# Patient Record
Sex: FEMALE | Race: WHITE | NOT HISPANIC OR LATINO | Employment: UNEMPLOYED | URBAN - NONMETROPOLITAN AREA
[De-identification: names, ages, dates, MRNs, and addresses within clinical notes are randomized per-mention and may not be internally consistent; named-entity substitution may affect disease eponyms.]

---

## 2022-06-27 ENCOUNTER — OFFICE VISIT (OUTPATIENT)
Dept: FAMILY MEDICINE CLINIC | Age: 37
End: 2022-06-27

## 2022-06-27 ENCOUNTER — HOSPITAL ENCOUNTER (OUTPATIENT)
Dept: GENERAL RADIOLOGY | Facility: HOSPITAL | Age: 37
Discharge: HOME | End: 2022-06-27
Admitting: NURSE PRACTITIONER

## 2022-06-27 VITALS
HEIGHT: 68 IN | SYSTOLIC BLOOD PRESSURE: 114 MMHG | WEIGHT: 164.2 LBS | HEART RATE: 65 BPM | DIASTOLIC BLOOD PRESSURE: 73 MMHG | BODY MASS INDEX: 24.89 KG/M2

## 2022-06-27 DIAGNOSIS — Z23 NEED FOR TDAP VACCINATION: ICD-10-CM

## 2022-06-27 DIAGNOSIS — H61.22 IMPACTED CERUMEN OF LEFT EAR: ICD-10-CM

## 2022-06-27 DIAGNOSIS — F41.1 GENERALIZED ANXIETY DISORDER: Primary | ICD-10-CM

## 2022-06-27 DIAGNOSIS — Z00.00 ANNUAL PHYSICAL EXAM: ICD-10-CM

## 2022-06-27 DIAGNOSIS — M41.9 SCOLIOSIS OF THORACIC SPINE, UNSPECIFIED SCOLIOSIS TYPE: ICD-10-CM

## 2022-06-27 DIAGNOSIS — M54.50 CHRONIC MIDLINE LOW BACK PAIN WITHOUT SCIATICA: ICD-10-CM

## 2022-06-27 DIAGNOSIS — G89.29 CHRONIC MIDLINE LOW BACK PAIN WITHOUT SCIATICA: ICD-10-CM

## 2022-06-27 DIAGNOSIS — F32.1 CURRENT MODERATE EPISODE OF MAJOR DEPRESSIVE DISORDER WITHOUT PRIOR EPISODE: ICD-10-CM

## 2022-06-27 PROCEDURE — 90715 TDAP VACCINE 7 YRS/> IM: CPT | Performed by: NURSE PRACTITIONER

## 2022-06-27 PROCEDURE — 72072 X-RAY EXAM THORAC SPINE 3VWS: CPT

## 2022-06-27 PROCEDURE — 90471 IMMUNIZATION ADMIN: CPT | Performed by: NURSE PRACTITIONER

## 2022-06-27 PROCEDURE — 72100 X-RAY EXAM L-S SPINE 2/3 VWS: CPT

## 2022-06-27 PROCEDURE — 99204 OFFICE O/P NEW MOD 45 MIN: CPT | Performed by: NURSE PRACTITIONER

## 2022-06-27 RX ORDER — MELOXICAM 7.5 MG/1
7.5 TABLET ORAL DAILY
Qty: 90 TABLET | Refills: 0 | Status: SHIPPED | OUTPATIENT
Start: 2022-06-27 | End: 2022-09-22

## 2022-06-27 RX ORDER — CITALOPRAM 10 MG/1
10 TABLET ORAL DAILY
Qty: 40 TABLET | Refills: 0 | Status: SHIPPED | OUTPATIENT
Start: 2022-06-27 | End: 2022-07-28

## 2022-06-27 RX ORDER — HYDROXYZINE HYDROCHLORIDE 25 MG/1
25 TABLET, FILM COATED ORAL 3 TIMES DAILY PRN
COMMUNITY
End: 2022-07-28 | Stop reason: SDUPTHER

## 2022-06-27 NOTE — ASSESSMENT & PLAN NOTE
-xray of thoracic and lumbar spine today in office  -Mobic given for pain  -refer to pain management

## 2022-06-27 NOTE — ASSESSMENT & PLAN NOTE
-pt would like to have ear flushed at follow up/later date  -impaction is not severe at this time

## 2022-06-27 NOTE — ASSESSMENT & PLAN NOTE
-start celexa 10 mg to see if this helps  -may cont hydroxyzine but hopefully we can get this dose lowered as celexa starts to work   -denies suicide, homicide and self harm thoughts  -educated on SE of celexa and to report to office if these occurs    normal...

## 2022-06-27 NOTE — PROGRESS NOTES
Leticia Durán presents to [facility] with complaint of  Establish Care and Anxiety (Discuss med)    SUBJECTIVE  History of Present Illness     The patient is here to establish relations. She recently moved here from [place] but she is originally from [place]. She is currently going through a divorce after being  for 15 years. She will be working at [employer] and starts there on Monday.     She had screening blood work 6 months ago that was normal per pt.     She does have a hx of anxiety and depression, but never required meds until this past December. She started having anxiety and panic attacks that were triggered when he grandmother passed away this past December. She feels like her anxiety and depression are worsened as she is going through divorce. She has been on hydroxyzine 75 mg daily and the hydroxyzine is not making a difference. She says she has episodes of crying. She takes the hydroxyzine at nighttime.     She is having back pain, this is not new for her but she says this pain is getting worse. She does say she has a history of scoliosis but was not severe enough to require brace or surgery per pt. She does see a chiropractor but has not been giving pain relief recently.  She has lower back pain that is descibed as a constant ache, pain does not radiate. Denies numbness or tingling, or limb weakness. She takes Tylenol as needed and it does help sometimes. She has done PT for back pain for 6 months back in 2017 which made her pain worse she says.     She has had hyst so she does not need cervical cancer screens.     The following portions of the patient's history were reviewed and updated as appropriate: allergies, current medications, past family history, past medical history, past social history, past surgical history and problem list.    OBJECTIVE  Vital Signs:   /73 (BP Location: Left arm, Patient Position: Sitting)   Pulse 65   Ht 172.7 cm  "(68\")   Wt 74.5 kg (164 lb 3.2 oz)   BMI 24.97 kg/m²       Physical Exam  Vitals reviewed.   Constitutional:       General: She is not in acute distress.     Appearance: Normal appearance. She is not ill-appearing.   HENT:      Head: Normocephalic and atraumatic.      Right Ear: Tympanic membrane and ear canal normal.      Left Ear: Tympanic membrane normal. There is impacted cerumen.      Nose: Nose normal.      Mouth/Throat:      Mouth: Mucous membranes are moist.      Pharynx: Oropharynx is clear.   Neck:      Thyroid: No thyromegaly or thyroid tenderness.   Cardiovascular:      Rate and Rhythm: Normal rate and regular rhythm.      Pulses: Normal pulses.      Heart sounds: Normal heart sounds.   Pulmonary:      Effort: Pulmonary effort is normal.      Breath sounds: Normal breath sounds.   Musculoskeletal:      Cervical back: Neck supple.   Lymphadenopathy:      Cervical: No cervical adenopathy.   Skin:     General: Skin is warm and dry.      Capillary Refill: Capillary refill takes less than 2 seconds.   Neurological:      General: No focal deficit present.      Mental Status: She is alert and oriented to person, place, and time. Mental status is at baseline.   Psychiatric:         Mood and Affect: Mood normal.         Behavior: Behavior normal.         Judgment: Judgment normal.              ASSESSMENT AND PLAN:  Diagnoses and all orders for this visit:    1. Generalized anxiety disorder (Primary)  Assessment & Plan:  -start celexa 10 mg to see if this helps  -may cont hydroxyzine but hopefully we can get this dose lowered as celexa starts to work   -denies suicide, homicide and self harm thoughts  -educated on SE of celexa and to report to office if these occurs     Orders:  -     citalopram (CeleXA) 10 MG tablet; Take 1 tablet by mouth Daily.  Dispense: 40 tablet; Refill: 0    2. Annual physical exam    3. Current moderate episode of major depressive disorder without prior episode ([facility])  Assessment & " Plan:  -phq9 is 12 today  -start celexa 10 mg qday      Orders:  -     citalopram (CeleXA) 10 MG tablet; Take 1 tablet by mouth Daily.  Dispense: 40 tablet; Refill: 0    4. Need for Tdap vaccination  -     Tdap Vaccine Greater Than or Equal To 6yo IM    5. Chronic midline low back pain without sciatica  Assessment & Plan:  -xray of thoracic and lumbar spine today in office  -Mobic given for pain  -refer to pain management     Orders:  -     Ambulatory Referral to Pain Management  -     meloxicam (Mobic) 7.5 MG tablet; Take 1 tablet by mouth Daily.  Dispense: 90 tablet; Refill: 0  -     XR Spine Lumbar 2 or 3 View (In Office)  -     XR Spine Thoracic 3 View (In Office)    6. Impacted cerumen of left ear  Assessment & Plan:  -pt would like to have ear flushed at follow up/later date  -impaction is not severe at this time       7. Scoliosis of thoracic spine, unspecified scoliosis type  -     Ambulatory Referral to Pain Management      19 to 39: Counseling/Anticipatory Guidance Discussed: nutrition, physical activity, healthy weight, injury prevention, dental health, mental health, immunizations and breast cancer and self breast exams    Follow Up   Return in about 1 month (around 7/27/2022). Patient to notify office with any acute concerns or issues.  Patient verbalizes understanding, agrees with plan of care and has no further questions upon discharge.     Patient was given instructions and counseling regarding her condition or for health maintenance advice. Please see specific information pulled into the AVS if appropriate.

## 2022-06-30 ENCOUNTER — PATIENT ROUNDING (BHMG ONLY) (OUTPATIENT)
Dept: FAMILY MEDICINE CLINIC | Age: 37
End: 2022-06-30

## 2022-06-30 NOTE — PROGRESS NOTES
June 30, 2022    Hello, may I speak with Leticia Durán?    My name is Giselle Shirley, RN    I am  with [facility]  [address] [facility] [address]  [facility] [address] [phone].    Before we get started may I verify your date of birth? 1985    I am calling to officially welcome you to our practice and ask about your recent visit. Is this a good time to talk? yes    Tell me about your visit with us. What things went well?  She was very nice and listened.  Went smooth.       We're always looking for ways to make our patients' experiences even better. Do you have recommendations on ways we may improve?  no    Overall were you satisfied with your first visit to our practice? yes       I appreciate you taking the time to speak with me today. Is there anything else I can do for you? no      Thank you, and have a great day.

## 2022-07-28 ENCOUNTER — OFFICE VISIT (OUTPATIENT)
Dept: FAMILY MEDICINE CLINIC | Age: 37
End: 2022-07-28

## 2022-07-28 VITALS
HEART RATE: 65 BPM | HEIGHT: 68 IN | DIASTOLIC BLOOD PRESSURE: 66 MMHG | BODY MASS INDEX: 24.95 KG/M2 | WEIGHT: 164.6 LBS | SYSTOLIC BLOOD PRESSURE: 116 MMHG

## 2022-07-28 DIAGNOSIS — F32.1 CURRENT MODERATE EPISODE OF MAJOR DEPRESSIVE DISORDER WITHOUT PRIOR EPISODE: ICD-10-CM

## 2022-07-28 DIAGNOSIS — F41.1 GENERALIZED ANXIETY DISORDER: Primary | ICD-10-CM

## 2022-07-28 PROCEDURE — 99213 OFFICE O/P EST LOW 20 MIN: CPT | Performed by: NURSE PRACTITIONER

## 2022-07-28 RX ORDER — CITALOPRAM 20 MG/1
20 TABLET ORAL DAILY
Qty: 40 TABLET | Refills: 0 | Status: SHIPPED | OUTPATIENT
Start: 2022-07-28 | End: 2022-08-23

## 2022-07-28 RX ORDER — HYDROXYZINE HYDROCHLORIDE 25 MG/1
25 TABLET, FILM COATED ORAL 3 TIMES DAILY PRN
Qty: 90 TABLET | Refills: 2 | Status: SHIPPED | OUTPATIENT
Start: 2022-07-28

## 2022-07-28 NOTE — PROGRESS NOTES
"Leticia Durán presents to [facility] with complaint of  Anxiety (1 month follow up/)    SUBJECTIV  History of Present Illness     She is here today for follow up of anxiety and depression. At her last visit, she was started on citalopram 10 mg daily for these reasons. She had also been using atarax 25 mg tid. Today, she reports still having a few random crying episodes, but overall she can tell somewhat of a difference in her moods. She does feel like the citalopram could be stronger. She is using 50 mg of atarax to help her with sleep and this does help. She also mentions that her family can see an improvement in her mood. The patient denies thoughts of suicide, self-harm, or homicide.       OBJECTIVE  Vital Signs:   /66 (BP Location: Right arm, Patient Position: Sitting)   Pulse 65   Ht 172.7 cm (68\")   Wt 74.7 kg (164 lb 9.6 oz)   BMI 25.03 kg/m²       Physical Exam  Vitals reviewed.   Constitutional:       General: She is not in acute distress.     Appearance: Normal appearance. She is not ill-appearing.   HENT:      Head: Normocephalic and atraumatic.      Nose: Nose normal.      Mouth/Throat:      Mouth: Mucous membranes are moist.      Pharynx: Oropharynx is clear.   Cardiovascular:      Rate and Rhythm: Normal rate and regular rhythm.      Pulses: Normal pulses.      Heart sounds: Normal heart sounds.   Pulmonary:      Effort: Pulmonary effort is normal.      Breath sounds: Normal breath sounds.   Musculoskeletal:      Cervical back: Neck supple.   Skin:     General: Skin is warm and dry.      Capillary Refill: Capillary refill takes less than 2 seconds.   Neurological:      General: No focal deficit present.      Mental Status: She is alert and oriented to person, place, and time. Mental status is at baseline.   Psychiatric:         Mood and Affect: Mood normal.         Behavior: Behavior normal.         Judgment: Judgment normal.          ASSESSMENT AND " PLAN:  Diagnoses and all orders for this visit:    1. Generalized anxiety disorder (Primary)  -     citalopram (CeleXA) 20 MG tablet; Take 1 tablet by mouth Daily.  Dispense: 40 tablet; Refill: 0  -     hydrOXYzine (ATARAX) 25 MG tablet; Take 1 tablet by mouth 3 (Three) Times a Day As Needed for Anxiety. Refill needed  Dispense: 90 tablet; Refill: 2    2. Current moderate episode of major depressive disorder without prior episode (HCC)  -     citalopram (CeleXA) 20 MG tablet; Take 1 tablet by mouth Daily.  Dispense: 40 tablet; Refill: 0  -     hydrOXYzine (ATARAX) 25 MG tablet; Take 1 tablet by mouth 3 (Three) Times a Day As Needed for Anxiety. Refill needed  Dispense: 90 tablet; Refill: 2      -patient seems to be doing better mentally overall  -we will increase her citalopram to 20 mg daily and hopefully, this will further improve her anxiety and depression symptoms   -atarax refilled  -follow up in one month to re-evaluate increase in citalopram    Follow Up   Return in about 1 month (around 8/28/2022). Patient to notify office with any acute concerns or issues.  Patient verbalizes understanding, agrees with plan of care and has no further questions upon discharge.     Patient was given instructions and counseling regarding her condition or for health maintenance advice. Please see specific information pulled into the AVS if appropriate.     Discussed the importance of following up with any needed screening tests/labs/specialist appointments and any requested follow-up recommended by me today. Importance of maintaining follow-up discussed and patient accepts that missed appointments can delay diagnosis and potentially lead to worsening of conditions.

## 2022-08-12 DIAGNOSIS — F32.1 CURRENT MODERATE EPISODE OF MAJOR DEPRESSIVE DISORDER WITHOUT PRIOR EPISODE: ICD-10-CM

## 2022-08-12 DIAGNOSIS — F41.1 GENERALIZED ANXIETY DISORDER: ICD-10-CM

## 2022-08-12 RX ORDER — CITALOPRAM 10 MG/1
10 TABLET ORAL DAILY
Qty: 40 TABLET | Refills: 0 | OUTPATIENT
Start: 2022-08-12

## 2022-08-23 ENCOUNTER — HOSPITAL ENCOUNTER (EMERGENCY)
Dept: HOSPITAL 49 - FER | Age: 37
Discharge: HOME | End: 2022-08-23
Payer: COMMERCIAL

## 2022-08-23 ENCOUNTER — OFFICE VISIT (OUTPATIENT)
Dept: FAMILY MEDICINE CLINIC | Age: 37
End: 2022-08-23

## 2022-08-23 VITALS
SYSTOLIC BLOOD PRESSURE: 121 MMHG | WEIGHT: 165.2 LBS | HEART RATE: 76 BPM | OXYGEN SATURATION: 100 % | TEMPERATURE: 98.1 F | DIASTOLIC BLOOD PRESSURE: 70 MMHG | HEIGHT: 68 IN | BODY MASS INDEX: 25.04 KG/M2

## 2022-08-23 DIAGNOSIS — F41.1 GENERALIZED ANXIETY DISORDER: ICD-10-CM

## 2022-08-23 DIAGNOSIS — F41.0 PANIC ATTACK: Primary | ICD-10-CM

## 2022-08-23 DIAGNOSIS — F41.9: Primary | ICD-10-CM

## 2022-08-23 DIAGNOSIS — F32.1 CURRENT MODERATE EPISODE OF MAJOR DEPRESSIVE DISORDER WITHOUT PRIOR EPISODE: ICD-10-CM

## 2022-08-23 PROCEDURE — 99213 OFFICE O/P EST LOW 20 MIN: CPT | Performed by: NURSE PRACTITIONER

## 2022-08-23 RX ORDER — CITALOPRAM 40 MG/1
40 TABLET ORAL DAILY
Qty: 90 TABLET | Refills: 1 | Status: SHIPPED | OUTPATIENT
Start: 2022-08-23 | End: 2022-11-01

## 2022-08-23 NOTE — PROGRESS NOTES
"Leticia Durán presents to [facility] with complaint of  Anxiety (Pt states she had an anxiety attack at work this am. Pt states she had a hard time breathing, she was light headed, hyperventilating, and fingers were tingling. )    SUBJECTIVE  History of Present Illness     Patient is here today after having anxiety attack at work this morning. She says that attack came on quickly. She says her heart started racing, she was hyperventilating, and her fingers became numb. She has been on Celexa since 6/27, was at 10 mg and she was increased to 20 mg 7/28. She says up until today, she had not had any panic attacks since she has been on the celexa. She did take 25 mg of atarax during the panic attack today and she says is did seem to help after a two hour period. She denies having any recent stressors or events that would have triggered her panic attack today. She says her boss helped remove her from work and sat with her and did deep breathing exercises which also helped.       OBJECTIVE  Vital Signs:   /70 (BP Location: Left arm, Patient Position: Sitting, Cuff Size: Adult)   Pulse 76   Temp 98.1 °F (36.7 °C) (Oral)   Ht 172.7 cm (67.99\")   Wt 74.9 kg (165 lb 3.2 oz)   SpO2 100% Comment: room air  BMI 25.12 kg/m²       Physical Exam  Vitals reviewed.   Constitutional:       General: She is not in acute distress.     Appearance: Normal appearance. She is not ill-appearing.   HENT:      Head: Normocephalic and atraumatic.      Nose: Nose normal.      Mouth/Throat:      Mouth: Mucous membranes are moist.      Pharynx: Oropharynx is clear.   Cardiovascular:      Rate and Rhythm: Normal rate and regular rhythm.      Pulses: Normal pulses.      Heart sounds: Normal heart sounds.   Pulmonary:      Effort: Pulmonary effort is normal.      Breath sounds: Normal breath sounds.   Musculoskeletal:      Cervical back: Neck supple.   Skin:     General: Skin is warm and dry.      " Capillary Refill: Capillary refill takes less than 2 seconds.   Neurological:      General: No focal deficit present.      Mental Status: She is alert and oriented to person, place, and time. Mental status is at baseline.   Psychiatric:         Mood and Affect: Mood normal.         Behavior: Behavior normal.         Judgment: Judgment normal.          ASSESSMENT AND PLAN:  Diagnoses and all orders for this visit:    1. Panic attack (Primary)    2. Generalized anxiety disorder  -     citalopram (CeleXA) 40 MG tablet; Take 1 tablet by mouth Daily.  Dispense: 90 tablet; Refill: 1    3. Current moderate episode of major depressive disorder without prior episode (HCC)  -     citalopram (CeleXA) 40 MG tablet; Take 1 tablet by mouth Daily.  Dispense: 90 tablet; Refill: 1      -patient is doing well on celexa overall, her dose will be increased to 40 mg qday since she had panic attack today  -cont atarax prn  -highly encouraged counseling to patient but she says it is difficult for her to have time for this given her work schedule   -will follow up in one month to see how she is doing     Follow Up   Return in about 1 month (around 9/23/2022). Patient to notify office with any acute concerns or issues.  Patient verbalizes understanding, agrees with plan of care and has no further questions upon discharge.     Patient was given instructions and counseling regarding her condition or for health maintenance advice. Please see specific information pulled into the AVS if appropriate.     Discussed the importance of following up with any needed screening tests/labs/specialist appointments and any requested follow-up recommended by me today. Importance of maintaining follow-up discussed and patient accepts that missed appointments can delay diagnosis and potentially lead to worsening of conditions.

## 2022-09-03 DIAGNOSIS — F41.1 GENERALIZED ANXIETY DISORDER: ICD-10-CM

## 2022-09-03 DIAGNOSIS — F32.1 CURRENT MODERATE EPISODE OF MAJOR DEPRESSIVE DISORDER WITHOUT PRIOR EPISODE: ICD-10-CM

## 2022-09-05 ENCOUNTER — HOSPITAL ENCOUNTER (EMERGENCY)
Dept: HOSPITAL 49 - FER | Age: 37
Discharge: HOME | End: 2022-09-05
Payer: COMMERCIAL

## 2022-09-05 DIAGNOSIS — U07.1: Primary | ICD-10-CM

## 2022-09-05 DIAGNOSIS — Z88.5: ICD-10-CM

## 2022-09-05 DIAGNOSIS — Z28.310: ICD-10-CM

## 2022-09-05 LAB
CORONAVIRUS 2019 SARS-COV-2: POSITIVE
INFLUENZA A NAA: NEGATIVE

## 2022-09-05 PROCEDURE — U0002 COVID-19 LAB TEST NON-CDC: HCPCS

## 2022-09-06 RX ORDER — CITALOPRAM 20 MG/1
20 TABLET ORAL DAILY
Qty: 40 TABLET | Refills: 0 | OUTPATIENT
Start: 2022-09-06

## 2022-09-22 DIAGNOSIS — M54.50 CHRONIC MIDLINE LOW BACK PAIN WITHOUT SCIATICA: ICD-10-CM

## 2022-09-22 DIAGNOSIS — G89.29 CHRONIC MIDLINE LOW BACK PAIN WITHOUT SCIATICA: ICD-10-CM

## 2022-09-22 RX ORDER — MELOXICAM 7.5 MG/1
7.5 TABLET ORAL DAILY
Qty: 30 TABLET | Refills: 0 | Status: SHIPPED | OUTPATIENT
Start: 2022-09-22 | End: 2022-11-01

## 2022-09-29 ENCOUNTER — TELEPHONE (OUTPATIENT)
Dept: FAMILY MEDICINE CLINIC | Age: 37
End: 2022-09-29

## 2022-11-01 ENCOUNTER — OFFICE VISIT (OUTPATIENT)
Dept: FAMILY MEDICINE CLINIC | Age: 37
End: 2022-11-01

## 2022-11-01 VITALS
SYSTOLIC BLOOD PRESSURE: 105 MMHG | DIASTOLIC BLOOD PRESSURE: 72 MMHG | HEART RATE: 65 BPM | WEIGHT: 166 LBS | BODY MASS INDEX: 25.16 KG/M2 | HEIGHT: 68 IN

## 2022-11-01 DIAGNOSIS — G89.29 CHRONIC MIDLINE THORACIC BACK PAIN: ICD-10-CM

## 2022-11-01 DIAGNOSIS — F32.1 CURRENT MODERATE EPISODE OF MAJOR DEPRESSIVE DISORDER WITHOUT PRIOR EPISODE: ICD-10-CM

## 2022-11-01 DIAGNOSIS — M54.6 CHRONIC MIDLINE THORACIC BACK PAIN: ICD-10-CM

## 2022-11-01 DIAGNOSIS — G89.29 CHRONIC MIDLINE LOW BACK PAIN WITHOUT SCIATICA: Primary | ICD-10-CM

## 2022-11-01 DIAGNOSIS — M41.9 SCOLIOSIS OF THORACIC SPINE, UNSPECIFIED SCOLIOSIS TYPE: ICD-10-CM

## 2022-11-01 DIAGNOSIS — M54.50 CHRONIC MIDLINE LOW BACK PAIN WITHOUT SCIATICA: Primary | ICD-10-CM

## 2022-11-01 DIAGNOSIS — F41.1 GENERALIZED ANXIETY DISORDER: ICD-10-CM

## 2022-11-01 PROCEDURE — 99214 OFFICE O/P EST MOD 30 MIN: CPT | Performed by: NURSE PRACTITIONER

## 2022-11-01 RX ORDER — BACLOFEN 10 MG/1
10 TABLET ORAL 3 TIMES DAILY
Qty: 30 TABLET | Refills: 1 | Status: SHIPPED | OUTPATIENT
Start: 2022-11-01

## 2022-11-01 RX ORDER — CITALOPRAM 40 MG/1
40 TABLET ORAL DAILY
Qty: 90 TABLET | Refills: 1 | Status: SHIPPED | OUTPATIENT
Start: 2022-11-01

## 2022-11-01 RX ORDER — IBUPROFEN 800 MG/1
800 TABLET ORAL EVERY 6 HOURS PRN
Qty: 30 TABLET | Refills: 1 | Status: SHIPPED | OUTPATIENT
Start: 2022-11-01

## 2022-11-01 NOTE — PROGRESS NOTES
"Leticia Durán presents to [facility] with complaint of  Back Pain    SUBJECTIVE  Back Pain  This is a chronic problem. The current episode started more than 1 year ago. The problem occurs constantly. The problem has been gradually worsening since onset. The pain is present in the lumbar spine and thoracic spine. The quality of the pain is described as burning, shooting and stabbing. The pain does not radiate. The pain is severe. The pain is the same all the time. The symptoms are aggravated by bending, sitting, standing and position. Stiffness is present at night. Pertinent negatives include no abdominal pain, bladder incontinence, bowel incontinence, chest pain, dysuria, fever, headaches, leg pain, numbness, paresis, paresthesias, pelvic pain, perianal numbness, tingling, weakness or weight loss. Risk factors: scoliosis  She has tried NSAIDs (PT, saw pain management ) for the symptoms. Improvement on treatment: motrin helps, nothing else    Thoracic and lumbar xray show DDD and mild scoliosis. Patient was seen by pain mangement and says all that was done there was stretching, no other treatment options offered she says. Will request office note from pain management as this was not sent to me.     VIDHYA/depression: She had been taking Celexa for VIDHYA and depression but ran out, needing this med refilled. She has not had any panic attacks since she was seen here last she says. She rarely uses hydroxyzine.     OBJECTIVE  Vital Signs:   /72 (BP Location: Left arm, Patient Position: Sitting)   Pulse 65   Ht 172.7 cm (67.99\")   Wt 75.3 kg (166 lb)   BMI 25.25 kg/m²       Physical Exam  Vitals reviewed.   Constitutional:       General: She is not in acute distress.     Appearance: Normal appearance. She is not ill-appearing.   HENT:      Head: Normocephalic and atraumatic.      Nose: Nose normal.      Mouth/Throat:      Mouth: Mucous membranes are moist.      Pharynx: Oropharynx " is clear.   Cardiovascular:      Rate and Rhythm: Normal rate and regular rhythm.      Pulses: Normal pulses.      Heart sounds: Normal heart sounds.   Pulmonary:      Effort: Pulmonary effort is normal.      Breath sounds: Normal breath sounds.   Musculoskeletal:      Cervical back: Neck supple.      Thoracic back: Tenderness and bony tenderness present. Scoliosis present.      Lumbar back: Tenderness present. Positive right straight leg raise test and positive left straight leg raise test.   Skin:     General: Skin is warm and dry.   Neurological:      General: No focal deficit present.      Mental Status: She is alert and oriented to person, place, and time. Mental status is at baseline.   Psychiatric:         Mood and Affect: Mood normal.         Behavior: Behavior normal.         Judgment: Judgment normal.          Results Review:  The following data was reviewed by Zahira Catnrell, ABIODUN [unfilled] 15:31 EDT.  XR Spine Lumbar 2 or 3 View (In Office) (06/27/2022 12:17)  XR Spine Thoracic 3 View (In Office) (06/27/2022 12:17)      ASSESSMENT AND PLAN:  Diagnoses and all orders for this visit:    1. Chronic midline low back pain without sciatica (Primary)  Assessment & Plan:  - Mobic did not help her pain  -She has been taking ibuprofen and this has helped so she was given a prescription for this today as well as baclofen  -She was educated on drowsiness side effect of baclofen and to use caution with this medication  -She was advised to take this medication with food and only take on an as-needed basis  -She understands the risk of long-term NSAID use  -Orders were placed to obtain MRI, she has tried and failed physical therapy  -She would like to be evaluated by neurosurgery, order placed    Orders:  -     MRI Lumbar Spine Without Contrast; Future  -     Ambulatory Referral to Neurosurgery  -     ibuprofen (ADVIL,MOTRIN) 800 MG tablet; Take 1 tablet by mouth Every 6 (Six) Hours As Needed for Moderate Pain.  Dispense:  30 tablet; Refill: 1  -     baclofen (LIORESAL) 10 MG tablet; Take 1 tablet by mouth 3 (Three) Times a Day.  Dispense: 30 tablet; Refill: 1  -     MRI Thoracic Spine Without Contrast; Future    2. Generalized anxiety disorder  Assessment & Plan:  Psychological condition is improving with treatment.  Continue current treatment regimen.  Psychological condition  will be reassessed at the next regular appointment.  She was given a 6-month supply of Celexa    Orders:  -     citalopram (CeleXA) 40 MG tablet; Take 1 tablet by mouth Daily.  Dispense: 90 tablet; Refill: 1    3. Current moderate episode of major depressive disorder without prior episode ([facility])  Assessment & Plan:  - Improving, continue Celexa 40 mg once per day    Orders:  -     citalopram (CeleXA) 40 MG tablet; Take 1 tablet by mouth Daily.  Dispense: 90 tablet; Refill: 1    4. Chronic midline thoracic back pain  -     MRI Lumbar Spine Without Contrast; Future  -     Ambulatory Referral to Neurosurgery  -     ibuprofen (ADVIL,MOTRIN) 800 MG tablet; Take 1 tablet by mouth Every 6 (Six) Hours As Needed for Moderate Pain.  Dispense: 30 tablet; Refill: 1  -     baclofen (LIORESAL) 10 MG tablet; Take 1 tablet by mouth 3 (Three) Times a Day.  Dispense: 30 tablet; Refill: 1  -     MRI Thoracic Spine Without Contrast; Future    5. Scoliosis of thoracic spine, unspecified scoliosis type  Assessment & Plan:  -may be cause of her chronic pain   -Further work-up as discussed above          Follow Up   No follow-ups on file. Patient to notify office with any acute concerns or issues.  Patient verbalizes understanding, agrees with plan of care and has no further questions upon discharge.     Patient was given instructions and counseling regarding her condition or for health maintenance advice. Please see specific information pulled into the AVS if appropriate.     Discussed the importance of following up with any needed screening tests/labs/specialist appointments and any  requested follow-up recommended by me today. Importance of maintaining follow-up discussed and patient accepts that missed appointments can delay diagnosis and potentially lead to worsening of conditions.

## 2022-11-01 NOTE — ASSESSMENT & PLAN NOTE
Psychological condition is improving with treatment.  Continue current treatment regimen.  Psychological condition  will be reassessed at the next regular appointment.  She was given a 6-month supply of Celexa

## 2022-11-01 NOTE — ASSESSMENT & PLAN NOTE
- Mobic did not help her pain  -She has been taking ibuprofen and this has helped so she was given a prescription for this today as well as baclofen  -She was educated on drowsiness side effect of baclofen and to use caution with this medication  -She was advised to take this medication with food and only take on an as-needed basis  -She understands the risk of long-term NSAID use  -Orders were placed to obtain MRI, she has tried and failed physical therapy  -She would like to be evaluated by neurosurgery, order placed

## 2022-11-11 ENCOUNTER — OFFICE VISIT (OUTPATIENT)
Dept: NEUROSURGERY | Facility: CLINIC | Age: 37
End: 2022-11-11

## 2022-11-11 VITALS
HEIGHT: 68 IN | WEIGHT: 171 LBS | HEART RATE: 77 BPM | BODY MASS INDEX: 25.91 KG/M2 | SYSTOLIC BLOOD PRESSURE: 118 MMHG | DIASTOLIC BLOOD PRESSURE: 72 MMHG

## 2022-11-11 DIAGNOSIS — M47.816 LUMBAR FACET ARTHROPATHY: ICD-10-CM

## 2022-11-11 DIAGNOSIS — G89.29 CHRONIC MIDLINE LOW BACK PAIN WITHOUT SCIATICA: ICD-10-CM

## 2022-11-11 DIAGNOSIS — M51.36 DDD (DEGENERATIVE DISC DISEASE), LUMBAR: Primary | ICD-10-CM

## 2022-11-11 DIAGNOSIS — M54.50 CHRONIC MIDLINE LOW BACK PAIN WITHOUT SCIATICA: ICD-10-CM

## 2022-11-11 PROCEDURE — 99204 OFFICE O/P NEW MOD 45 MIN: CPT | Performed by: PHYSICIAN ASSISTANT

## 2022-11-11 NOTE — PROGRESS NOTES
"Chief Complaint  Back Pain    Subjective          Leticia Durán who is a 37 y.o. year old female who presents to [facility] for Evaluation of the Spine.     The patient complains of pain located in the Lumbar Spine.  Patients states the pain has been present for 5 years.  The pain came on gradually.  The pain scaled level is 8.  The pain does not radiate.  The pain is constant and waxing/waning and described as sharp and dull.  The pain is worse at no particular time of day. Patient states laying on a heating pad makes the pain better.  Patient states Bending makes the pain worse.    Associated Symptoms Include: Denies Numbness, Tingling, Weakness and Loss of Bowel or Bladder Control  Conservative Interventions Include: NSAIDs that were somewhat effective., Muscle Relaxants that were somewhat effective. and  Chiropractor that was ineffective.    Was this the result of an injury or accident?: No    History of Previous Spinal Surgery?: No     reports that she has never smoked. She has never used smokeless tobacco.    Review of Systems   Musculoskeletal: Positive for back pain.        Objective   Vital Signs:   /72   Pulse 77   Ht 172.7 cm (68\")   Wt 77.6 kg (171 lb)   BMI 26.00 kg/m²       Physical Exam  Constitutional:       Appearance: Normal appearance.   Pulmonary:      Effort: Pulmonary effort is normal.   Musculoskeletal:         General: Tenderness (midline lumbar spine, bilateral lumbar paraspinals,  bilateral SI joint area) present.      Comments: SLR negative bilaterally   Neurological:      General: No focal deficit present.      Mental Status: She is alert and oriented to person, place, and time.      Sensory: No sensory deficit.      Motor: No weakness.      Deep Tendon Reflexes: Reflexes normal.   Psychiatric:         Mood and Affect: Mood normal.         Behavior: Behavior normal.        Neurologic Exam     Mental Status   Oriented to person, " place, and time.        Result Review     I have personally reviewed the x-ray of lumbar spine from 6/27/2022 which shows mild L5-S1 degenerative disc disease and facet hypertrophy.    I have personally reviewed the x-ray of thoracic spine from 6/27/2022 which shows mild upper thoracic spine scoliosis.     Assessment and Plan    Diagnoses and all orders for this visit:    1. DDD (degenerative disc disease), lumbar (Primary)  -     Ambulatory Referral to Pain Management    2. Lumbar facet arthropathy  -     Ambulatory Referral to Pain Management    3. Chronic midline low back pain without sciatica  -     Ambulatory Referral to Pain Management    Her pain is in the lower back.    She does have degenerative change at the L5-S1 level with facet arthritis on the x-ray.    I do not expect that surgical approach would be helpful to her back pain.    She has not had an MRI, but without leg pain I suspect that having those images will not change her treatment options. Her PCP has ordered an MRI of the lumbar and thoracic spines. I would be happy to review the MRIs once available and let her know if it changes my recommendations.    I do suspect that she could benefit from a trial of MBBs/RFA in the lumbar spine, particularly at the L5-S1 level, as there is facet arthritis on the x-ray of lumbar spine.    She has some scoliosis in the upper thoracic spine, which is mild. I do not expect that this is contributing to her overall pain.    She does have some tenderness in the bilateral SI joint areas. She may benefit from a trial of SI joint injections bilaterally.    She has muscle tenderness in the bilateral lumbar paraspinals and most likely would benefit from trigger point injections.    I will refer her to [facility] in [place] to consult for these treatment options.    She has tried and failed PT. I do not expect a repeat course to be helpful.    The patient was counseled on basic recommendations for the reduction and prevention  of back, neck, or spine pain in association with spinal disorders, including: cessation/avoidance of nicotine use, maintenance of a healthy BMI and weight, focusing on building/maintaining core strength through core exercise, and avoidance of activities which worsen the pain. The patient will monitor for changes in symptoms and notify our clinic of these changes as needed.    She will follow-up here PRN.    Follow Up {Instructions Charge Capture  Follow-up Communications :23}  Return if symptoms worsen or fail to improve.  Patient was given instructions and counseling regarding her condition or for health maintenance advice. Please see specific information pulled into the AVS if appropriate.

## 2022-11-23 ENCOUNTER — OFFICE VISIT (OUTPATIENT)
Dept: FAMILY MEDICINE CLINIC | Age: 37
End: 2022-11-23

## 2022-11-23 VITALS
HEART RATE: 73 BPM | BODY MASS INDEX: 26.22 KG/M2 | WEIGHT: 173 LBS | DIASTOLIC BLOOD PRESSURE: 70 MMHG | SYSTOLIC BLOOD PRESSURE: 107 MMHG | HEIGHT: 68 IN | TEMPERATURE: 98.3 F

## 2022-11-23 DIAGNOSIS — G89.29 CHRONIC MIDLINE LOW BACK PAIN WITHOUT SCIATICA: Primary | ICD-10-CM

## 2022-11-23 DIAGNOSIS — M54.50 CHRONIC MIDLINE LOW BACK PAIN WITHOUT SCIATICA: Primary | ICD-10-CM

## 2022-11-23 DIAGNOSIS — M51.36 DDD (DEGENERATIVE DISC DISEASE), LUMBAR: ICD-10-CM

## 2022-11-23 DIAGNOSIS — M47.816 LUMBAR FACET ARTHROPATHY: ICD-10-CM

## 2022-11-23 PROCEDURE — 99213 OFFICE O/P EST LOW 20 MIN: CPT

## 2022-11-23 NOTE — PROGRESS NOTES
"Subjective     CHIEF COMPLAINT    Chief Complaint   Patient presents with   • Back Pain     Lower back pain, dull sharp x \"a long time\". Pt states it is a constant thing.      History of Present Illness  Patient is a 37-year-old female who presents today with back pain.  She has a longstanding history of chronic back pain for many years which includes degenerative disc disease and lumbar facet arthropathy. The pain comes and goes.  Describes it as a dull sharp pain.  This current episode started on Monday.  She had to miss work last night due to the pain.  Denies any urinary/bowel issues. Denies weakness, numbness, radiating pain.  She has MRI of the lumbar and thoracic spine ordered per her PCP.  She has done physical therapy and it has not helped.  She does report that heat helps at home.  She is also taking baclofen and Ibuprofen. Pain worsens with bending.     Upon chart review, noted that patient has recently seen neurosurgery for this issue and has been referred to pain management.  Her appointment with pain management is scheduled for next week.       Allergies   Allergen Reactions   • Morphine Itching     Current Outpatient Medications on File Prior to Visit   Medication Sig Dispense Refill   • baclofen (LIORESAL) 10 MG tablet Take 1 tablet by mouth 3 (Three) Times a Day. 30 tablet 1   • Calcium-Vitamin D-Vitamin K 500-100-40 MG-UNT-MCG chewable tablet Chew.     • citalopram (CeleXA) 40 MG tablet Take 1 tablet by mouth Daily. 90 tablet 1   • hydrOXYzine (ATARAX) 25 MG tablet Take 1 tablet by mouth 3 (Three) Times a Day As Needed for Anxiety. Refill needed 90 tablet 2   • ibuprofen (ADVIL,MOTRIN) 800 MG tablet Take 1 tablet by mouth Every 6 (Six) Hours As Needed for Moderate Pain. 30 tablet 1     No current facility-administered medications on file prior to visit.     /70 (BP Location: Left arm, Patient Position: Sitting)   Pulse 73   Temp 98.3 °F (36.8 °C) (Oral)   Ht 172.7 cm (67.99\")   Wt 78.5 kg " (173 lb)   BMI 26.31 kg/m²     Objective     Physical Exam  Vitals and nursing note reviewed.   Constitutional:       General: She is not in acute distress.     Appearance: Normal appearance. She is not ill-appearing.   HENT:      Head: Normocephalic.   Cardiovascular:      Rate and Rhythm: Normal rate and regular rhythm.      Heart sounds: Normal heart sounds. No murmur heard.  Pulmonary:      Effort: Pulmonary effort is normal. No respiratory distress.      Breath sounds: Normal breath sounds. No wheezing or rhonchi.   Musculoskeletal:      Lumbar back: Tenderness present. No swelling, edema, deformity, signs of trauma, lacerations or spasms. Normal range of motion. Negative right straight leg raise test and negative left straight leg raise test.   Skin:     General: Skin is warm and dry.   Neurological:      General: No focal deficit present.      Mental Status: She is alert and oriented to person, place, and time.      Motor: Motor function is intact. No weakness.      Gait: Gait is intact.   Psychiatric:         Mood and Affect: Mood normal.         Behavior: Behavior normal.               Diagnoses and all orders for this visit:    1. Chronic midline low back pain without sciatica (Primary)    2. DDD (degenerative disc disease), lumbar    3. Lumbar facet arthropathy      No concerning findings on exam today. Reviewed recent notes from neurosurgery as well as patient's PCP.  Recommend continue with referral to pain management which is next week as neurosurgery feels patient would benefit from joint injections. Patient is agreeable with this plan.  Continue with heat, Motrin, baclofen as needed as these provide some relief.  If pain becomes severe or develops new symptoms, proceed to ER for further evaluation. Patient voiced understanding of all instructions and had no further questions at this time.         Follow up:  Return if symptoms worsen or fail to improve.  Patient was given instructions and counseling  regarding her condition or for health maintenance advice. Please see specific information pulled into the AVS if appropriate.

## 2022-12-01 ENCOUNTER — OFFICE VISIT (OUTPATIENT)
Dept: FAMILY MEDICINE CLINIC | Age: 37
End: 2022-12-01

## 2022-12-01 VITALS
DIASTOLIC BLOOD PRESSURE: 64 MMHG | OXYGEN SATURATION: 99 % | BODY MASS INDEX: 25.82 KG/M2 | HEIGHT: 68 IN | TEMPERATURE: 98 F | SYSTOLIC BLOOD PRESSURE: 117 MMHG | WEIGHT: 170.4 LBS | HEART RATE: 94 BPM

## 2022-12-01 DIAGNOSIS — R68.83 CHILLS: ICD-10-CM

## 2022-12-01 DIAGNOSIS — J02.9 SORE THROAT: ICD-10-CM

## 2022-12-01 DIAGNOSIS — B34.9 ACUTE VIRAL SYNDROME: Primary | ICD-10-CM

## 2022-12-01 DIAGNOSIS — R05.1 ACUTE COUGH: ICD-10-CM

## 2022-12-01 LAB
EXPIRATION DATE: NORMAL
EXPIRATION DATE: NORMAL
FLUAV AG UPPER RESP QL IA.RAPID: NOT DETECTED
FLUBV AG UPPER RESP QL IA.RAPID: NOT DETECTED
INTERNAL CONTROL: NORMAL
INTERNAL CONTROL: NORMAL
Lab: NORMAL
Lab: NORMAL
S PYO AG THROAT QL: NEGATIVE
SARS-COV-2 AG UPPER RESP QL IA.RAPID: NOT DETECTED

## 2022-12-01 PROCEDURE — 87880 STREP A ASSAY W/OPTIC: CPT | Performed by: PHYSICIAN ASSISTANT

## 2022-12-01 PROCEDURE — 87081 CULTURE SCREEN ONLY: CPT | Performed by: PHYSICIAN ASSISTANT

## 2022-12-01 PROCEDURE — 99213 OFFICE O/P EST LOW 20 MIN: CPT | Performed by: PHYSICIAN ASSISTANT

## 2022-12-01 PROCEDURE — 87428 SARSCOV & INF VIR A&B AG IA: CPT | Performed by: PHYSICIAN ASSISTANT

## 2022-12-01 RX ORDER — BROMPHENIRAMINE MALEATE, PSEUDOEPHEDRINE HYDROCHLORIDE, AND DEXTROMETHORPHAN HYDROBROMIDE 2; 30; 10 MG/5ML; MG/5ML; MG/5ML
5 SYRUP ORAL 4 TIMES DAILY PRN
Qty: 118 ML | Refills: 0 | Status: SHIPPED | OUTPATIENT
Start: 2022-12-01

## 2022-12-01 NOTE — PROGRESS NOTES
Subjective     CHIEF COMPLAINT    Chief Complaint   Patient presents with   • Cough     Pt c/o cough, body aches, chills, diarrhea, sore throat. Symptoms ongoing since . Pt states she feels as if her sx have only gotten worse since.              History of Present Illness  This is a 37-year-old female presenting to the clinic complaining of body aches, cough, and chills since 2022.  She states that her body aches are her biggest complaint.  Denies any known sick contacts or fevers.            Review of Systems   Constitutional: Positive for chills. Negative for fatigue and fever.   HENT: Positive for sore throat. Negative for rhinorrhea.    Respiratory: Positive for cough. Negative for shortness of breath and wheezing.    Cardiovascular: Negative for chest pain.   Gastrointestinal: Positive for diarrhea. Negative for abdominal pain, nausea and vomiting.   Musculoskeletal: Positive for myalgias.   Skin: Negative for rash.   Neurological: Negative for headaches.            Past Medical History:   Diagnosis Date   • ADHD (attention deficit hyperactivity disorder)    • Allergic     Seasonal   • Anemia    • Anxiety 2022    Happened at work   • Fibromyalgia, primary 2018    I was told by a chiropractor i had it   • Low back pain     All my life   • Scoliosis     All my life            Past Surgical History:   Procedure Laterality Date   •  SECTION     • HYSTERECTOMY     • TONSILLECTOMY     • TUBAL ABDOMINAL LIGATION  2015            Family History   Problem Relation Age of Onset   • Arthritis Mother    • Arthritis Father    • Hearing loss Father    • Hyperlipidemia Father             Social History     Socioeconomic History   • Marital status: Legally    Tobacco Use   • Smoking status: Never   • Smokeless tobacco: Never   Vaping Use   • Vaping Use: Never used   Substance and Sexual Activity   • Alcohol use: Yes     Comment: Occasion   • Drug use: Never   • Sexual  "activity: Not Currently     Partners: Male     Birth control/protection: Surgical            Allergies   Allergen Reactions   • Morphine Itching            Current Outpatient Medications on File Prior to Visit   Medication Sig Dispense Refill   • baclofen (LIORESAL) 10 MG tablet Take 1 tablet by mouth 3 (Three) Times a Day. 30 tablet 1   • Calcium-Vitamin D-Vitamin K 500-100-40 MG-UNT-MCG chewable tablet Chew.     • citalopram (CeleXA) 40 MG tablet Take 1 tablet by mouth Daily. 90 tablet 1   • hydrOXYzine (ATARAX) 25 MG tablet Take 1 tablet by mouth 3 (Three) Times a Day As Needed for Anxiety. Refill needed 90 tablet 2   • ibuprofen (ADVIL,MOTRIN) 800 MG tablet Take 1 tablet by mouth Every 6 (Six) Hours As Needed for Moderate Pain. 30 tablet 1     No current facility-administered medications on file prior to visit.            /64 (BP Location: Right arm, Patient Position: Sitting)   Pulse 94   Temp 98 °F (36.7 °C) (Oral)   Ht 172.7 cm (67.99\")   Wt 77.3 kg (170 lb 6.4 oz)   SpO2 99% Comment: room air  BMI 25.92 kg/m²          Objective     Physical Exam  Vitals and nursing note reviewed.   Constitutional:       General: She is not in acute distress.     Appearance: Normal appearance.   HENT:      Head: Normocephalic and atraumatic.      Right Ear: Tympanic membrane, ear canal and external ear normal.      Left Ear: Tympanic membrane, ear canal and external ear normal.      Nose: Rhinorrhea present. No congestion.      Mouth/Throat:      Mouth: Mucous membranes are moist.      Pharynx: Oropharynx is clear. Posterior oropharyngeal erythema present.   Eyes:      Extraocular Movements: Extraocular movements intact.      Conjunctiva/sclera: Conjunctivae normal.      Pupils: Pupils are equal, round, and reactive to light.   Cardiovascular:      Rate and Rhythm: Normal rate and regular rhythm.      Heart sounds: Normal heart sounds.   Pulmonary:      Effort: Pulmonary effort is normal. No respiratory distress. "      Breath sounds: Normal breath sounds. No wheezing, rhonchi or rales.   Abdominal:      General: There is no distension.      Palpations: Abdomen is soft.      Tenderness: There is abdominal tenderness (Mild generalized tenderness, especially of lower abdomen). There is no guarding or rebound.   Musculoskeletal:      Cervical back: Normal range of motion. No rigidity.   Skin:     General: Skin is warm and dry.   Neurological:      Mental Status: She is alert and oriented to person, place, and time.   Psychiatric:         Mood and Affect: Mood normal.         Behavior: Behavior normal.              Procedures                    Lab Results (last 24 hours)     Procedure Component Value Units Date/Time    POCT SARS-CoV-2 Antigen SUZETTE + Flu [[id number]] Collected: 12/01/22 1406    Specimen: Swab Updated: 12/01/22 1406     SARS Antigen Not Detected     Influenza A Antigen SUZETTE Not Detected     Influenza B Antigen SUZETTE Not Detected     Internal Control Passed     Lot Number [id number]     Expiration Date 9/6/23    POCT rapid strep A [[id number]] Collected: 12/01/22 1406    Specimen: Swab Updated: 12/01/22 1406     Rapid Strep A Screen Negative     Internal Control Passed     Lot Number [id number]     Expiration Date 2/28/24                No Radiology Exams Resulted Within Past 24 Hours                    Diagnoses and all orders for this visit:    1. Acute viral syndrome (Primary)  Comments:  Recommend continued supportive care.  Patient will contact the office for worsening symptoms or if she is not better by 12/5/2022.  Return/ER precautions discus    2. Sore throat  -     POCT rapid strep A  -     Beta Strep Culture, Throat - , Throat; Future  -     Beta Strep Culture, Throat - Swab, Throat    3. Chills  -     POCT SARS-CoV-2 Antigen SUZETTE + Flu    4. Acute cough  -     brompheniramine-pseudoephedrine-DM 30-2-10 MG/5ML syrup; Take 5 mL by mouth 4 (Four) Times a Day As Needed for Allergies.  Dispense: 118 mL; Refill: 0              Additional Instructions for the Follow-ups that You Need to Schedule     Beta Strep Culture, Throat - , Throat    Dec 01, 2022 (Approximate)      Release to patient: Routine Release                         FOR FULL DISCHARGE INSTRUCTIONS/COMMENTS/HANDOUTS please see the   AVS

## 2022-12-03 LAB — BACTERIA SPEC AEROBE CULT: NORMAL

## 2022-12-29 ENCOUNTER — TELEPHONE (OUTPATIENT)
Dept: FAMILY MEDICINE CLINIC | Age: 37
End: 2022-12-29

## 2022-12-29 NOTE — TELEPHONE ENCOUNTER
Caller: Leticia Durán    Relationship: Self    Best call back number: [phone]    What is the medical concern/diagnosis: HEEL SPURS     What specialty or service is being requested: FOOT SPECIALIST     What is the provider, practice or medical service name: PATIENT WOULD LIKE LET PROVIDER PICK OUT PLACE     What is the office location: [facility]

## 2023-04-06 ENCOUNTER — TELEPHONE (OUTPATIENT)
Dept: FAMILY MEDICINE CLINIC | Age: 38
End: 2023-04-06

## 2023-04-06 NOTE — TELEPHONE ENCOUNTER
"Caller: Leticia Durán \"Narcisa\"    Relationship to patient: Self    Best call back number: [phone]    Chief complaint: STITCH REMOVAL    Type of visit: IN-OFFICE PROCEDURE    Requested date: 04/10/23 OR 04/11/23 MORNING     Additional notes: THE PATIENT WOULD LIKE A CALL BACK TO SCHEDULE A STITCH REMOVAL          "

## 2023-04-10 ENCOUNTER — PROCEDURE VISIT (OUTPATIENT)
Dept: FAMILY MEDICINE CLINIC | Age: 38
End: 2023-04-10
Payer: COMMERCIAL

## 2023-04-10 VITALS
HEART RATE: 81 BPM | HEIGHT: 68 IN | BODY MASS INDEX: 27.16 KG/M2 | SYSTOLIC BLOOD PRESSURE: 112 MMHG | DIASTOLIC BLOOD PRESSURE: 60 MMHG | WEIGHT: 179.2 LBS | OXYGEN SATURATION: 100 %

## 2023-04-10 DIAGNOSIS — Z48.02 VISIT FOR SUTURE REMOVAL: Primary | ICD-10-CM

## 2023-04-10 RX ORDER — TIZANIDINE 2 MG/1
TABLET ORAL
COMMUNITY
Start: 2022-12-12 | End: 2023-04-10

## 2023-04-10 RX ORDER — HYDROCODONE BITARTRATE AND ACETAMINOPHEN 5; 325 MG/1; MG/1
TABLET ORAL
COMMUNITY
Start: 2023-04-03

## 2023-04-10 RX ORDER — NAPROXEN 500 MG/1
TABLET ORAL
COMMUNITY
Start: 2023-04-03

## 2023-04-10 NOTE — PROGRESS NOTES
"Chief Complaint  Procedure (Suture removal' located on left finger; pt had these done at [facility] last Sunday )    Subjective        Leticia Durán presents to [facility]  Wound Check  She was originally treated 10 to 14 days ago. There has been no drainage from the wound. There is no redness present. There is no swelling present. The pain has not changed.       Objective   Vital Signs:  /60 (BP Location: Right arm, Patient Position: Sitting, Cuff Size: Adult)   Pulse 81   Ht 172.7 cm (67.99\")   Wt 81.3 kg (179 lb 3.2 oz)   SpO2 100% Comment: room air  BMI 27.26 kg/m²   Estimated body mass index is 27.26 kg/m² as calculated from the following:    Height as of this encounter: 172.7 cm (67.99\").    Weight as of this encounter: 81.3 kg (179 lb 3.2 oz).             Physical Exam  Skin:     General: Skin is warm and dry.          Neurological:      Mental Status: She is alert and oriented to person, place, and time.   Psychiatric:         Mood and Affect: Mood normal.        Result Review :            Suture Removal    Date/Time: 4/10/2023 10:02 AM  Performed by: Olga Cartwright APRN  Authorized by: Olga Cartwright APRN   Body area: upper extremity  Location details: left index finger  Wound Appearance: clean and tender  Sutures Removed: 4  Post-removal: Steri-Strips applied and dressing applied  Patient tolerance: patient tolerated the procedure well with no immediate complications  Comments: Discussed procedure, alternatives, possible complications and answered all questions. Written consent was obtained. Patient was in sitting position. No complications. Patient ambulatory at discharge.               Assessment and Plan   Diagnoses and all orders for this visit:    1. Visit for suture removal (Primary)  Comments:  Laceration clean, dry and intact, no warmth, redness or drainage noted  Orders:  -     Suture Removal             Follow Up   Return if symptoms worsen or fail " to improve.  Patient was given instructions and counseling regarding her condition or for health maintenance advice. Please see specific information pulled into the AVS if appropriate.

## 2023-04-10 NOTE — LETTER
April 10, 2023     Patient: Leticia Durán   YOB: 1985   Date of Visit: 4/10/2023       To Whom It May Concern:    It is my medical opinion that Leticia Durán may return to light duty immediately with the following restrictions: 4/14/2023 .           Sincerely,        ABIODUN Briseno    CC: No Recipients

## 2023-04-18 NOTE — PROGRESS NOTES
"Chief Complaint  Nasal Congestion (Onset since 4/15/23./) and Wound Check    Subjective          Leticia Durán presents to [facility]  History of Present Illness  She got her left index finger cut between a plate and metal container approximately 3 weeks. She got her sutures removed on 04/10. She denies discharge from the wound site. She still has pain. She has been cleaning it soap.   URI   This is a new problem. The current episode started 1 to 4 weeks ago. The problem has been unchanged. There has been no fever. Associated symptoms include congestion, coughing (\"from drainage\"), a plugged ear sensation (occasional), rhinorrhea and sneezing. Pertinent negatives include no diarrhea, ear pain, headaches, nausea, sinus pain, sore throat, swollen glands or vomiting. Treatments tried: ruslan alfred. The treatment provided mild relief.       Objective   Vital Signs:   /69 (BP Location: Left arm, Patient Position: Sitting)   Pulse 93   Temp 98.2 °F (36.8 °C) (Oral)   Ht 172.7 cm (67.99\")   Wt 80.8 kg (178 lb 3.2 oz)   SpO2 99% Comment: room air  BMI 27.10 kg/m²     Physical Exam  Constitutional:       Appearance: Normal appearance. She is normal weight.   HENT:      Head: Normocephalic.      Right Ear: Tympanic membrane, ear canal and external ear normal.      Left Ear: Tympanic membrane, ear canal and external ear normal.      Nose: Congestion present.      Mouth/Throat:      Mouth: Mucous membranes are moist.      Pharynx: Oropharynx is clear. No oropharyngeal exudate or posterior oropharyngeal erythema.   Eyes:      Conjunctiva/sclera: Conjunctivae normal.      Pupils: Pupils are equal, round, and reactive to light.   Cardiovascular:      Rate and Rhythm: Normal rate and regular rhythm.      Pulses: Normal pulses.      Heart sounds: Normal heart sounds.   Pulmonary:      Effort: Pulmonary effort is normal.      Breath sounds: Normal breath sounds.   Musculoskeletal:     "  Cervical back: Normal range of motion.   Skin:     Comments: Left index laceration; please see picture   Neurological:      Mental Status: She is alert and oriented to person, place, and time.   Psychiatric:         Mood and Affect: Mood normal.         Behavior: Behavior normal.         Thought Content: Thought content normal.            Left index finger    Result Review :   The following data was reviewed by: ABIODUN Palacios on 04/19/2023:                  Assessment and Plan    Diagnoses and all orders for this visit:    1. Laceration of left index finger without foreign body without damage to nail, sequela (Primary)  -     mupirocin (BACTROBAN) 2 % ointment; Apply 1 application topically to the appropriate area as directed 3 (Three) Times a Day.  Dispense: 30 g; Refill: 0    2. Seasonal allergic rhinitis due to pollen  -     fluticasone (FLONASE) 50 MCG/ACT nasal spray; 2 sprays into the nostril(s) as directed by provider Daily.  Dispense: 16 g; Refill: 0  -     cetirizine (zyrTEC) 10 MG tablet; Take 1 tablet by mouth Daily.  Dispense: 30 tablet; Refill: 0    We have discussed that her wound does not look infected and there may be some scar tissue.  We will take a while for the sensation to return to the index finger.  We will give her some Bactroban to help with the healing process May return to full duty on Monday.  I also believe that her upper respiratory symptoms are most likely due to allergies.  We will give her a trial of Flonase and Zyrtec      Follow Up   No follow-ups on file.  Patient was given instructions and counseling regarding her condition or for health maintenance advice. Please see specific information pulled into the AVS if appropriate.

## 2023-04-19 ENCOUNTER — OFFICE VISIT (OUTPATIENT)
Dept: FAMILY MEDICINE CLINIC | Age: 38
End: 2023-04-19
Payer: COMMERCIAL

## 2023-04-19 VITALS
HEIGHT: 68 IN | DIASTOLIC BLOOD PRESSURE: 69 MMHG | OXYGEN SATURATION: 99 % | WEIGHT: 178.2 LBS | TEMPERATURE: 98.2 F | BODY MASS INDEX: 27.01 KG/M2 | SYSTOLIC BLOOD PRESSURE: 122 MMHG | HEART RATE: 93 BPM

## 2023-04-19 DIAGNOSIS — S61.211S LACERATION OF LEFT INDEX FINGER WITHOUT FOREIGN BODY WITHOUT DAMAGE TO NAIL, SEQUELA: Primary | ICD-10-CM

## 2023-04-19 DIAGNOSIS — J30.1 SEASONAL ALLERGIC RHINITIS DUE TO POLLEN: ICD-10-CM

## 2023-04-19 RX ORDER — CETIRIZINE HYDROCHLORIDE 10 MG/1
10 TABLET ORAL DAILY
Qty: 30 TABLET | Refills: 0 | Status: SHIPPED | OUTPATIENT
Start: 2023-04-19

## 2023-04-19 RX ORDER — FLUTICASONE PROPIONATE 50 MCG
2 SPRAY, SUSPENSION (ML) NASAL DAILY
Qty: 16 G | Refills: 0 | Status: SHIPPED | OUTPATIENT
Start: 2023-04-19

## 2023-10-26 ENCOUNTER — LAB (OUTPATIENT)
Dept: LAB | Facility: HOSPITAL | Age: 38
End: 2023-10-26
Payer: COMMERCIAL

## 2023-10-26 ENCOUNTER — OFFICE VISIT (OUTPATIENT)
Dept: FAMILY MEDICINE CLINIC | Age: 38
End: 2023-10-26
Payer: COMMERCIAL

## 2023-10-26 VITALS
BODY MASS INDEX: 31.16 KG/M2 | WEIGHT: 205.6 LBS | HEIGHT: 68 IN | DIASTOLIC BLOOD PRESSURE: 64 MMHG | HEART RATE: 74 BPM | SYSTOLIC BLOOD PRESSURE: 115 MMHG

## 2023-10-26 DIAGNOSIS — N39.3 STRESS INCONTINENCE IN FEMALE: ICD-10-CM

## 2023-10-26 DIAGNOSIS — Z00.00 ANNUAL PHYSICAL EXAM: Primary | ICD-10-CM

## 2023-10-26 DIAGNOSIS — Z00.00 ROUTINE ADULT HEALTH MAINTENANCE: ICD-10-CM

## 2023-10-26 DIAGNOSIS — R53.83 OTHER FATIGUE: ICD-10-CM

## 2023-10-26 DIAGNOSIS — Z13.29 SCREENING FOR THYROID DISORDER: ICD-10-CM

## 2023-10-26 DIAGNOSIS — E66.9 OBESITY (BMI 30-39.9): ICD-10-CM

## 2023-10-26 DIAGNOSIS — Z23 NEED FOR INFLUENZA VACCINATION: ICD-10-CM

## 2023-10-26 DIAGNOSIS — F41.1 GENERALIZED ANXIETY DISORDER: ICD-10-CM

## 2023-10-26 DIAGNOSIS — E55.9 VITAMIN D DEFICIENCY: ICD-10-CM

## 2023-10-26 LAB
25(OH)D3 SERPL-MCNC: 21.3 NG/ML (ref 30–100)
ALBUMIN SERPL-MCNC: 4.3 G/DL (ref 3.5–5.2)
ALBUMIN/GLOB SERPL: 1.4 G/DL
ALP SERPL-CCNC: 58 U/L (ref 39–117)
ALT SERPL W P-5'-P-CCNC: 12 U/L (ref 1–33)
ANION GAP SERPL CALCULATED.3IONS-SCNC: 9.9 MMOL/L (ref 5–15)
AST SERPL-CCNC: 18 U/L (ref 1–32)
BASOPHILS # BLD AUTO: 0.03 10*3/MM3 (ref 0–0.2)
BASOPHILS NFR BLD AUTO: 0.4 % (ref 0–1.5)
BILIRUB SERPL-MCNC: 0.4 MG/DL (ref 0–1.2)
BUN SERPL-MCNC: 8 MG/DL (ref 6–20)
BUN/CREAT SERPL: 10.5 (ref 7–25)
CALCIUM SPEC-SCNC: 9.7 MG/DL (ref 8.6–10.5)
CHLORIDE SERPL-SCNC: 104 MMOL/L (ref 98–107)
CO2 SERPL-SCNC: 25.1 MMOL/L (ref 22–29)
CREAT SERPL-MCNC: 0.76 MG/DL (ref 0.57–1)
DEPRECATED RDW RBC AUTO: 44.3 FL (ref 37–54)
EGFRCR SERPLBLD CKD-EPI 2021: 104.3 ML/MIN/1.73
EOSINOPHIL # BLD AUTO: 0.11 10*3/MM3 (ref 0–0.4)
EOSINOPHIL NFR BLD AUTO: 1.6 % (ref 0.3–6.2)
ERYTHROCYTE [DISTWIDTH] IN BLOOD BY AUTOMATED COUNT: 12.5 % (ref 12.3–15.4)
FOLATE SERPL-MCNC: 8.69 NG/ML (ref 4.78–24.2)
GLOBULIN UR ELPH-MCNC: 3 GM/DL
GLUCOSE SERPL-MCNC: 75 MG/DL (ref 65–99)
HCT VFR BLD AUTO: 40.3 % (ref 34–46.6)
HGB BLD-MCNC: 12.8 G/DL (ref 12–15.9)
IMM GRANULOCYTES # BLD AUTO: 0.01 10*3/MM3 (ref 0–0.05)
IMM GRANULOCYTES NFR BLD AUTO: 0.1 % (ref 0–0.5)
LYMPHOCYTES # BLD AUTO: 1.91 10*3/MM3 (ref 0.7–3.1)
LYMPHOCYTES NFR BLD AUTO: 28.5 % (ref 19.6–45.3)
MCH RBC QN AUTO: 30.1 PG (ref 26.6–33)
MCHC RBC AUTO-ENTMCNC: 31.8 G/DL (ref 31.5–35.7)
MCV RBC AUTO: 94.8 FL (ref 79–97)
MONOCYTES # BLD AUTO: 0.73 10*3/MM3 (ref 0.1–0.9)
MONOCYTES NFR BLD AUTO: 10.9 % (ref 5–12)
NEUTROPHILS NFR BLD AUTO: 3.91 10*3/MM3 (ref 1.7–7)
NEUTROPHILS NFR BLD AUTO: 58.5 % (ref 42.7–76)
PLATELET # BLD AUTO: 242 10*3/MM3 (ref 140–450)
PMV BLD AUTO: 9.5 FL (ref 6–12)
POTASSIUM SERPL-SCNC: 4.6 MMOL/L (ref 3.5–5.2)
PROT SERPL-MCNC: 7.3 G/DL (ref 6–8.5)
RBC # BLD AUTO: 4.25 10*6/MM3 (ref 3.77–5.28)
SODIUM SERPL-SCNC: 139 MMOL/L (ref 136–145)
TSH SERPL DL<=0.05 MIU/L-ACNC: 0.95 UIU/ML (ref 0.27–4.2)
VIT B12 BLD-MCNC: 383 PG/ML (ref 211–946)
WBC NRBC COR # BLD: 6.7 10*3/MM3 (ref 3.4–10.8)

## 2023-10-26 PROCEDURE — 80053 COMPREHEN METABOLIC PANEL: CPT

## 2023-10-26 PROCEDURE — 82607 VITAMIN B-12: CPT

## 2023-10-26 PROCEDURE — 82306 VITAMIN D 25 HYDROXY: CPT

## 2023-10-26 PROCEDURE — 82746 ASSAY OF FOLIC ACID SERUM: CPT

## 2023-10-26 PROCEDURE — 84443 ASSAY THYROID STIM HORMONE: CPT

## 2023-10-26 PROCEDURE — 36415 COLL VENOUS BLD VENIPUNCTURE: CPT

## 2023-10-26 PROCEDURE — 85025 COMPLETE CBC W/AUTO DIFF WBC: CPT

## 2023-10-26 RX ORDER — ESCITALOPRAM OXALATE 10 MG/1
10 TABLET ORAL DAILY
Qty: 90 TABLET | Refills: 0 | Status: SHIPPED | OUTPATIENT
Start: 2023-10-26

## 2023-10-26 RX ORDER — OXYBUTYNIN CHLORIDE 5 MG/1
5 TABLET, EXTENDED RELEASE ORAL DAILY
Qty: 30 TABLET | Refills: 1 | Status: SHIPPED | OUTPATIENT
Start: 2023-10-26

## 2023-10-26 RX ORDER — HYDROXYZINE HYDROCHLORIDE 25 MG/1
25 TABLET, FILM COATED ORAL 3 TIMES DAILY PRN
Qty: 90 TABLET | Refills: 0 | Status: SHIPPED | OUTPATIENT
Start: 2023-10-26

## 2023-10-26 NOTE — ASSESSMENT & PLAN NOTE
Discussed Kegel and pelvic floor physical therapy referral.  Patient would like to try oxybutynin.  Given her age, did not recommend that she be this on medication long-term.  She will consider pelvic floor physical therapy and let us know should she like that referral.

## 2023-10-26 NOTE — ASSESSMENT & PLAN NOTE
Discussed injury prevention, diet and exercise, safe sexual practices, and screening for common diseases. Encouraged use of sunscreen and seatbelts. Encouraged monthly self-breast exams. Avoidance of tobacco encouraged. Limitation or avoidance of alcohol encouraged. Recommend yearly dental and eye exams. Also discussed monitoring of blood pressure, lipids.

## 2023-10-26 NOTE — PROGRESS NOTES
"Leticia Durán presents to [facility] with complaint of  Anxiety and Annual Exam    SUBJECTIVE  History of Present Illness    She is here today for anxiety and annual exam.  Patient has history of anxiety disorder. Most recently, she was on Celexa and hydroxyzine last year. She has been off of these medications for over a year.  She felt like her anxiety was well controlled so she stopped taking.  Over the past month or so, she has felt more kelley, she is present with her significant other who also endorses the patients recent mood changes.  Patient denies any symptoms of depression, denies suicidal thoughts.  Patient says that she had a recent episode where she was extremely overwhelmed and had to sit down in the floor of the bathtub and cried.     Patient is also having issues with holding her bladder.  This mostly occurs whenever she coughs or sneezes but if she does not make it to the restroom in time, she does have accidents.  She says she has been dealing with this since her first child was born over 5 years ago.  She is asking if there is medication to help with this.    The following portions of the patient's history were reviewed and updated as appropriate: allergies, current medications, past family history, past medical history, past social history, past surgical history and problem list.  She no longer completes Pap smears as she has had hysterectomy.    OBJECTIVE  Vital Signs:   /64 (BP Location: Left arm, Patient Position: Sitting)   Pulse 74   Ht 172.7 cm (67.99\")   Wt 93.3 kg (205 lb 9.6 oz)   BMI 31.27 kg/m²       Physical Exam  Vitals reviewed.   Constitutional:       General: She is not in acute distress.     Appearance: Normal appearance. She is not ill-appearing.   HENT:      Head: Normocephalic and atraumatic.      Right Ear: Tympanic membrane and ear canal normal.      Left Ear: Tympanic membrane and ear canal normal.      Nose: Nose normal.      " Mouth/Throat:      Mouth: Mucous membranes are moist.      Pharynx: Oropharynx is clear.   Neck:      Thyroid: No thyroid mass, thyromegaly or thyroid tenderness.   Cardiovascular:      Rate and Rhythm: Normal rate and regular rhythm.      Pulses: Normal pulses.      Heart sounds: Normal heart sounds.   Pulmonary:      Effort: Pulmonary effort is normal.      Breath sounds: Normal breath sounds.   Musculoskeletal:      Cervical back: Neck supple.   Lymphadenopathy:      Cervical: No cervical adenopathy.   Skin:     General: Skin is warm and dry.   Neurological:      General: No focal deficit present.      Mental Status: She is alert and oriented to person, place, and time. Mental status is at baseline.   Psychiatric:         Mood and Affect: Mood normal.         Behavior: Behavior normal.         Judgment: Judgment normal.              ASSESSMENT AND PLAN:  Diagnoses and all orders for this visit:    1. Annual physical exam (Primary)  Assessment & Plan:  Discussed injury prevention, diet and exercise, safe sexual practices, and screening for common diseases. Encouraged use of sunscreen and seatbelts. Encouraged monthly self-breast exams. Avoidance of tobacco encouraged. Limitation or avoidance of alcohol encouraged. Recommend yearly dental and eye exams. Also discussed monitoring of blood pressure, lipids.       2. Need for influenza vaccination  -     Fluzone >6 Months ([id number])    3. Routine adult health maintenance  -     CBC & Differential; Future  -     Comprehensive Metabolic Panel; Future    4. Screening for thyroid disorder  -     TSH Rfx On Abnormal To Free T4; Future    5. Vitamin D deficiency  -     Vitamin D,25-Hydroxy; Future    6. Other fatigue  -     Vitamin B12 & Folate; Future    7. Stress incontinence in female  Assessment & Plan:  Discussed Kegel and pelvic floor physical therapy referral.  Patient would like to try oxybutynin.  Given her age, did not recommend that she be this on medication  long-term.  She will consider pelvic floor physical therapy and let us know should she like that referral.    Orders:  -     oxybutynin XL (DITROPAN-XL) 5 MG 24 hr tablet; Take 1 tablet by mouth Daily.  Dispense: 30 tablet; Refill: 1    8. Generalized anxiety disorder  Assessment & Plan:  Patient would like to try different medication other than Celexa.  We will try Lexapro 10 mg daily.  She was also given hydroxyzine.  Patient was educated on medication side effects.  If her anxiety symptoms worsen or suicidal thoughts occur, she was instructed to stop taking medication and notify office.  We will follow-up in 6 weeks for reassessment.    Orders:  -     escitalopram (Lexapro) 10 MG tablet; Take 1 tablet by mouth Daily.  Dispense: 90 tablet; Refill: 0  -     hydrOXYzine (ATARAX) 25 MG tablet; Take 1 tablet by mouth 3 (Three) Times a Day As Needed for Anxiety (insomnia).  Dispense: 90 tablet; Refill: 0    9. Obesity (BMI 30-39.9)  Assessment & Plan:  Patient's (Body mass index is 31.27 kg/m².) indicates that they are obese (BMI >30) with health conditions that include none . Weight is unchanged. BMI  is above average; BMI management plan is completed. We discussed portion control and increasing exercise.             Follow Up   Return in about 6 weeks (around 12/7/2023). Patient to notify office with any acute concerns or issues.  Patient verbalizes understanding, agrees with plan of care and has no further questions upon discharge.     Patient was given instructions and counseling regarding her condition or for health maintenance advice. Please see specific information pulled into the AVS if appropriate.     Discussed the importance of following up with any needed screening tests/labs/specialist appointments and any requested follow-up recommended by me today. Importance of maintaining follow-up discussed and patient accepts that missed appointments can delay diagnosis and potentially lead to worsening of  conditions.    Part of this note may be an electronic transcription/translation of spoken language to printed text using the Dragon Dictation System.

## 2023-10-26 NOTE — ASSESSMENT & PLAN NOTE
Patient would like to try different medication other than Celexa.  We will try Lexapro 10 mg daily.  She was also given hydroxyzine.  Patient was educated on medication side effects.  If her anxiety symptoms worsen or suicidal thoughts occur, she was instructed to stop taking medication and notify office.  We will follow-up in 6 weeks for reassessment.

## 2023-10-26 NOTE — ASSESSMENT & PLAN NOTE
Patient's (Body mass index is 31.27 kg/m².) indicates that they are obese (BMI >30) with health conditions that include none . Weight is unchanged. BMI  is above average; BMI management plan is completed. We discussed portion control and increasing exercise.

## 2023-10-27 ENCOUNTER — TELEPHONE (OUTPATIENT)
Dept: FAMILY MEDICINE CLINIC | Age: 38
End: 2023-10-27
Payer: COMMERCIAL

## 2023-10-27 RX ORDER — ERGOCALCIFEROL 1.25 MG/1
50000 CAPSULE ORAL
Qty: 4 CAPSULE | Refills: 3 | Status: SHIPPED | OUTPATIENT
Start: 2023-10-27

## 2023-10-27 NOTE — TELEPHONE ENCOUNTER
"Caller: Leticia Durán \"Narcisa\"    Relationship: Self    Best call back number: [phone]     Caller requesting test results: SELF    What test was performed: BLOOD WORK    When was the test performed: 10/26/23    Where was the test performed: [facility]    Additional notes: PATIENT STATES SHE WOULD LIKE A CALL BACK TO GO OVER HER TEST RESULTS.    "

## 2024-01-10 ENCOUNTER — TELEPHONE (OUTPATIENT)
Dept: FAMILY MEDICINE CLINIC | Age: 39
End: 2024-01-10
Payer: COMMERCIAL

## 2024-01-11 ENCOUNTER — OFFICE VISIT (OUTPATIENT)
Dept: FAMILY MEDICINE CLINIC | Age: 39
End: 2024-01-11
Payer: COMMERCIAL

## 2024-01-11 VITALS
BODY MASS INDEX: 31.8 KG/M2 | HEIGHT: 68 IN | HEART RATE: 80 BPM | SYSTOLIC BLOOD PRESSURE: 126 MMHG | DIASTOLIC BLOOD PRESSURE: 75 MMHG | WEIGHT: 209.8 LBS

## 2024-01-11 DIAGNOSIS — F41.1 GENERALIZED ANXIETY DISORDER: Primary | ICD-10-CM

## 2024-01-11 DIAGNOSIS — N39.3 STRESS INCONTINENCE IN FEMALE: ICD-10-CM

## 2024-01-11 PROCEDURE — 99214 OFFICE O/P EST MOD 30 MIN: CPT | Performed by: NURSE PRACTITIONER

## 2024-01-11 RX ORDER — ESCITALOPRAM OXALATE 10 MG/1
10 TABLET ORAL DAILY
Qty: 90 TABLET | Refills: 1 | Status: SHIPPED | OUTPATIENT
Start: 2024-01-11

## 2024-01-11 RX ORDER — HYDROXYZINE HYDROCHLORIDE 25 MG/1
25 TABLET, FILM COATED ORAL 3 TIMES DAILY PRN
Qty: 90 TABLET | Refills: 1 | Status: SHIPPED | OUTPATIENT
Start: 2024-01-11

## 2024-01-11 NOTE — ASSESSMENT & PLAN NOTE
Much improved since starting Lexapro.  Patient like to continue his medication.  She was given Lexapro and hydroxyzine refill.  Will follow-up in 6 months for reassessment

## 2024-01-11 NOTE — PROGRESS NOTES
"Leticia Durán presents to [facility] with complaint of  Anxiety (follow up )    SUBJECTIVE  History of Present Illness    Patient is here for follow-up of anxiety.  At her last visit, she was switched from Celexa to Lexapro.  Patient is also taking hydroxyzine 25 mg as needed.  She is needing hydroxyzine refilled.  Patient says that she has felt much improvement in her anxiety symptoms overall since starting Lexapro.  She denies any unusual side effects or thoughts of suicide.  Patient does not feel the medication needs to be stronger or wears off during the day.    Patient was also started on Ditropan for stress incontinence.  She is also been doing Kegel exercises.  Patient says that whenever she takes Ditropan once a month, the medication seems to work for the entire month.    OBJECTIVE  Vital Signs:   /75 (BP Location: Right arm, Patient Position: Sitting)   Pulse 80   Ht 172.7 cm (67.99\")   Wt 95.2 kg (209 lb 12.8 oz)   BMI 31.91 kg/m²       Physical Exam  Vitals reviewed.   Constitutional:       General: She is not in acute distress.     Appearance: Normal appearance. She is not ill-appearing.   HENT:      Head: Normocephalic and atraumatic.      Nose: Nose normal.      Mouth/Throat:      Mouth: Mucous membranes are moist.      Pharynx: Oropharynx is clear.   Cardiovascular:      Rate and Rhythm: Normal rate and regular rhythm.      Pulses: Normal pulses.      Heart sounds: Normal heart sounds.   Pulmonary:      Effort: Pulmonary effort is normal.      Breath sounds: Normal breath sounds.   Musculoskeletal:      Cervical back: Neck supple.   Skin:     General: Skin is warm and dry.   Neurological:      General: No focal deficit present.      Mental Status: She is alert and oriented to person, place, and time. Mental status is at baseline.   Psychiatric:         Mood and Affect: Mood normal.         Behavior: Behavior normal.         Judgment: Judgment normal. "            ASSESSMENT AND PLAN:  Diagnoses and all orders for this visit:    1. Generalized anxiety disorder (Primary)  Assessment & Plan:  Much improved since starting Lexapro.  Patient like to continue his medication.  She was given Lexapro and hydroxyzine refill.  Will follow-up in 6 months for reassessment    Orders:  -     escitalopram (Lexapro) 10 MG tablet; Take 1 tablet by mouth Daily.  Dispense: 90 tablet; Refill: 1  -     hydrOXYzine (ATARAX) 25 MG tablet; Take 1 tablet by mouth 3 (Three) Times a Day As Needed for Anxiety (insomnia).  Dispense: 90 tablet; Refill: 1    2. Stress incontinence in female  Assessment & Plan:  Improved with Ditropan as needed, continue Kegel exercises            Follow Up   Return in about 6 months (around 7/11/2024). Patient to notify office with any acute concerns or issues.  Patient verbalizes understanding, agrees with plan of care and has no further questions upon discharge.     Patient was given instructions and counseling regarding her condition or for health maintenance advice. Please see specific information pulled into the AVS if appropriate.     Discussed the importance of following up with any needed screening tests/labs/specialist appointments and any requested follow-up recommended by me today. Importance of maintaining follow-up discussed and patient accepts that missed appointments can delay diagnosis and potentially lead to worsening of conditions.    Part of this note may be an electronic transcription/translation of spoken language to printed text using the Dragon Dictation System.

## 2024-02-21 ENCOUNTER — TELEPHONE (OUTPATIENT)
Dept: FAMILY MEDICINE CLINIC | Age: 39
End: 2024-02-21
Payer: COMMERCIAL

## 2024-02-21 NOTE — TELEPHONE ENCOUNTER
Mychart message sent regarding over due Vitamin D level ord by pcp 10/27/23, due 1/22/24-1st attempt

## 2024-05-13 ENCOUNTER — OFFICE VISIT (OUTPATIENT)
Dept: FAMILY MEDICINE CLINIC | Age: 39
End: 2024-05-13
Payer: COMMERCIAL

## 2024-05-13 VITALS
BODY MASS INDEX: 35.46 KG/M2 | HEIGHT: 68 IN | SYSTOLIC BLOOD PRESSURE: 114 MMHG | WEIGHT: 234 LBS | HEART RATE: 69 BPM | DIASTOLIC BLOOD PRESSURE: 76 MMHG

## 2024-05-13 DIAGNOSIS — F99 INSOMNIA DUE TO OTHER MENTAL DISORDER: ICD-10-CM

## 2024-05-13 DIAGNOSIS — F51.05 INSOMNIA DUE TO OTHER MENTAL DISORDER: ICD-10-CM

## 2024-05-13 DIAGNOSIS — F41.1 GENERALIZED ANXIETY DISORDER: Primary | ICD-10-CM

## 2024-05-13 PROCEDURE — 99214 OFFICE O/P EST MOD 30 MIN: CPT | Performed by: NURSE PRACTITIONER

## 2024-05-13 RX ORDER — ESCITALOPRAM OXALATE 20 MG/1
20 TABLET ORAL DAILY
Qty: 90 TABLET | Refills: 1 | Status: SHIPPED | OUTPATIENT
Start: 2024-05-13

## 2024-05-13 RX ORDER — BUSPIRONE HYDROCHLORIDE 10 MG/1
10 TABLET ORAL 3 TIMES DAILY PRN
Qty: 90 TABLET | Refills: 1 | Status: SHIPPED | OUTPATIENT
Start: 2024-05-13

## 2024-05-13 NOTE — PROGRESS NOTES
"Leticia Durán presents to [facility] with complaint of  Anxiety (Not well-controlled)    SUBJECTIVE  History of Present Illness    Patient is here to discuss anxiety.  She is currently on Lexapro 10 mg daily.  She also uses hydroxyzine 25 mg 3 times per day as needed.  She is mostly taking this at bedtime to help her sleep which she says is not effective.  She takes 325 mg tablets at bedtime.  Patient mentions that she is no longer working at [employer] and started working as a care tender at [employer].  He attributes her increase in anxiety to this recent job change.  She is asking for recommendations on how she can get better control of her anxiety as she had in the past.      OBJECTIVE  Vital Signs:   /76 (BP Location: Left arm, Patient Position: Sitting)   Pulse 69   Ht 172.7 cm (67.99\")   Wt 106 kg (234 lb)   BMI 35.59 kg/m²       Physical Exam  Vitals reviewed.   Constitutional:       General: She is not in acute distress.     Appearance: Normal appearance. She is not ill-appearing.   HENT:      Head: Normocephalic and atraumatic.      Nose: Nose normal.      Mouth/Throat:      Mouth: Mucous membranes are moist.      Pharynx: Oropharynx is clear.   Cardiovascular:      Rate and Rhythm: Normal rate and regular rhythm.      Pulses: Normal pulses.      Heart sounds: Normal heart sounds.   Pulmonary:      Effort: Pulmonary effort is normal.      Breath sounds: Normal breath sounds.   Musculoskeletal:      Cervical back: Neck supple.   Skin:     General: Skin is warm and dry.   Neurological:      General: No focal deficit present.      Mental Status: She is alert and oriented to person, place, and time. Mental status is at baseline.   Psychiatric:         Mood and Affect: Mood is anxious (mild).         Behavior: Behavior normal.         Judgment: Judgment normal.              ASSESSMENT AND PLAN:  Diagnoses and all orders for this visit:    1. " Generalized anxiety disorder (Primary)  -     escitalopram (LEXAPRO) 20 MG tablet; Take 1 tablet by mouth Daily.  Dispense: 90 tablet; Refill: 1  -     busPIRone (BUSPAR) 10 MG tablet; Take 1 tablet by mouth 3 (Three) Times a Day As Needed (anxiety).  Dispense: 90 tablet; Refill: 1    2. Insomnia due to other mental disorder      Patient will increase Lexapro to 20 mg daily.  Also starting buspirone 10 mg 3 times per day as needed for her anxiety.  Discussed option of starting a insomnia specific medication such as trazodone, however would like to hold off on this and see if medication adjustment with Lexapro and BuSpar is effective before adding another medication.  We will follow-up in 3 weeks for reassessment of her anxiety.  If her sleeping is not improved, we will start trazodone more than likely.      Follow Up   Return in about 3 weeks (around 6/3/2024). Patient to notify office with any acute concerns or issues.  Patient verbalizes understanding, agrees with plan of care and has no further questions upon discharge.     Patient was given instructions and counseling regarding her condition or for health maintenance advice. Please see specific information pulled into the AVS if appropriate.     Discussed the importance of following up with any needed screening tests/labs/specialist appointments and any requested follow-up recommended by me today. Importance of maintaining follow-up discussed and patient accepts that missed appointments can delay diagnosis and potentially lead to worsening of conditions.    Part of this note may be an electronic transcription/translation of spoken language to printed text using the Dragon Dictation System.

## 2024-06-04 ENCOUNTER — OFFICE VISIT (OUTPATIENT)
Dept: FAMILY MEDICINE CLINIC | Age: 39
End: 2024-06-04
Payer: COMMERCIAL

## 2024-06-04 VITALS
HEART RATE: 79 BPM | BODY MASS INDEX: 35.16 KG/M2 | DIASTOLIC BLOOD PRESSURE: 75 MMHG | HEIGHT: 68 IN | WEIGHT: 232 LBS | SYSTOLIC BLOOD PRESSURE: 115 MMHG

## 2024-06-04 DIAGNOSIS — F51.05 INSOMNIA DUE TO OTHER MENTAL DISORDER: ICD-10-CM

## 2024-06-04 DIAGNOSIS — F41.1 GENERALIZED ANXIETY DISORDER: Primary | ICD-10-CM

## 2024-06-04 DIAGNOSIS — F99 INSOMNIA DUE TO OTHER MENTAL DISORDER: ICD-10-CM

## 2024-06-04 DIAGNOSIS — R21 RASH AND NONSPECIFIC SKIN ERUPTION: ICD-10-CM

## 2024-06-04 DIAGNOSIS — M71.329 OTHER BURSAL CYST, UNSPECIFIED ELBOW: ICD-10-CM

## 2024-06-04 PROCEDURE — 99214 OFFICE O/P EST MOD 30 MIN: CPT | Performed by: NURSE PRACTITIONER

## 2024-06-04 RX ORDER — TRIAMCINOLONE ACETONIDE 1 MG/G
1 OINTMENT TOPICAL 2 TIMES DAILY
Qty: 30 G | Refills: 0 | Status: SHIPPED | OUTPATIENT
Start: 2024-06-04

## 2024-06-04 RX ORDER — TRAZODONE HYDROCHLORIDE 50 MG/1
50 TABLET ORAL NIGHTLY
Qty: 90 TABLET | Refills: 0 | Status: SHIPPED | OUTPATIENT
Start: 2024-06-04

## 2024-06-04 NOTE — PROGRESS NOTES
"                        Crystal K Anthony   1/30/2017 12:40 PM   Office Visit    Dept Phone:  [phone]   Encounter #:  [id number]    Provider:  MELVI Cook   Department:  [facility]NA[facility]                Your Full Care Plan              Where to Get Your Medications      You can get these medications from any pharmacy     Bring a paper prescription for each of these medications     OxyCODONE HCl ER 30 MG tablet extended-release 12 hour    oxyCODONE-acetaminophen 7.5-325 MG per tablet            Your Updated Medication List          This list is accurate as of: 1/30/17 12:27 PM.  Always use your most recent med list.                albuterol 108 (90 BASE) MCG/ACT inhaler   Commonly known as:  PROVENTIL HFA;VENTOLIN HFA   Inhale 2 puffs Daily As Needed for shortness of air.       ALPRAZolam 2 MG tablet   Commonly known as:  XANAX       aspirin 81 MG EC tablet   Take 1 tablet by mouth daily.       docusate sodium 100 MG capsule   Commonly known as:  COLACE   Take 1 capsule by mouth 2 (Two) Times a Day.       DOLACET PO       DULoxetine 60 MG capsule   Commonly known as:  CYMBALTA   Take 1 capsule by mouth Daily.       DYMISTA 137-50 MCG/ACT suspension   Generic drug:  Azelastine-Fluticasone       gabapentin 300 MG capsule   Commonly known as:  NEURONTIN       Insulin Syringe 31G X 5/16\" 1 ML misc   Use twice daily for shots       KOMBIGLYZE XR 5-1000 MG tablet sustained-release 24 hour   Generic drug:  SAXagliptin-MetFORMIN ER       LACTOBACILLUS EXTRA STRENGTH capsule   Take 1 capsule by mouth Daily.       lansoprazole 30 MG disintegrating tablet   Commonly known as:  PREVACID SOLUTAB       LANTUS 100 UNIT/ML injection   Generic drug:  insulin glargine       levETIRAcetam 500 MG tablet   Commonly known as:  KEPPRA   Take 1 tablet by mouth Every 12 (Twelve) Hours.       Linaclotide 145 MCG capsule   Commonly known as:  LINZESS   Take 145 mcg by mouth Daily.       magnesium " "Leticia Durán presents to [facility] with complaint of  Anxiety (Follow up)    SUBJECTIVE  History of Present Illness    Patient is here for 3-week follow-up of her anxiety and insomnia.  At her last visit, her Lexapro was increased to 20 mg daily.  We also started BuSpar 10 mg 3 times per day as needed.  He had recently changed jobs which was increasing her anxiety.  Her job is going very well since her anxiety is better controlled now.  Patient has had significant improvement since bumping her Lexapro up to 20 mg daily.  She says that the BuSpar does not make any difference.  Unfortunately, she is still not sleeping very well.  Takes a long time to fall asleep.  She would like to try a insomnia specific medication at this point.     Patient also has a bump on her elbow she is wanting to have looked at.  Has been present for 2 months and is slowly increasing in size.  Area is tender.     She also has scattered rash on her upper abdomen.  Has been noticed for a couple of days.  Rash is itchy.  Denies any contact irritants.       OBJECTIVE  Vital Signs:   /75 (BP Location: Left arm, Patient Position: Sitting)   Pulse 79   Ht 172.7 cm (68\")   Wt 105 kg (232 lb)   BMI 35.28 kg/m²       Physical Exam  Vitals reviewed.   Constitutional:       General: She is not in acute distress.     Appearance: Normal appearance. She is not ill-appearing.   HENT:      Head: Normocephalic and atraumatic.      Nose: Nose normal.      Mouth/Throat:      Mouth: Mucous membranes are moist.      Pharynx: Oropharynx is clear.   Cardiovascular:      Rate and Rhythm: Normal rate and regular rhythm.      Pulses: Normal pulses.      Heart sounds: Normal heart sounds.   Pulmonary:      Effort: Pulmonary effort is normal.      Breath sounds: Normal breath sounds.   Musculoskeletal:      Right elbow: Deformity (tiny round cyst like mass present, tender to touch) present.      Cervical back: Neck " hydroxide 400 MG/5ML suspension   Commonly known as:  MILK OF MAGNESIA   Take 15 mL by mouth Daily As Needed for constipation.       metFORMIN  MG 24 hr tablet   Commonly known as:  GLUCOPHAGE-XR       ondansetron ODT 4 MG disintegrating tablet   Commonly known as:  ZOFRAN-ODT   Take 1 tablet by mouth 4 (four) times a day as needed for nausea or vomiting for up to 15 doses.       OxyCODONE HCl ER 30 MG tablet extended-release 12 hour   Commonly known as:  oxyCONTIN   Take 1 tablet by mouth 2 (Two) Times a Day.       oxyCODONE-acetaminophen 7.5-325 MG per tablet   Commonly known as:  PERCOCET   Take 1 tablet by mouth Every 6 (Six) Hours As Needed for severe pain (7-10).       polyethylene glycol powder   Commonly known as:  MIRALAX       PRENATAL 28-0.8 MG tablet   Take one po daily       raNITIdine 150 MG tablet   Commonly known as:  ZANTAC       traZODone 100 MG tablet   Commonly known as:  DESYREL       valsartan 80 MG tablet   Commonly known as:  DIOVAN   Take 1 tablet by mouth Daily.               You Were Diagnosed With        Codes Comments    Other chronic pain    -  Primary ICD-10-CM: G89.29  ICD-9-CM: 338.29     Kienbock disease, adult     ICD-10-CM: M93.1  ICD-9-CM: 732.8     Pain in both upper extremities     ICD-10-CM: M79.601, M79.602  ICD-9-CM: 729.5     Encounter for long-term (current) use of high-risk medication     ICD-10-CM: Z79.899  ICD-9-CM: V58.69       Instructions     None    Patient Instructions History      Upcoming Appointments     Visit Type Date Time Department    OFFICE VISIT 1/30/2017 12:40 PM [facility]      [facility] Signup     Our records indicate that you have an active [employer] account.    You can view your After Visit Summary by going to [employer] and logging in with your [facility] username and password.  If you don't have a [facility] username and password but a parent or guardian has access to your record, the parent or guardian should login  supple.   Skin:     General: Skin is warm and dry.      Findings: Rash (scattered pinpoint scabbed lesions noted to upper right abdomen, no signs of infection seen) present.   Neurological:      General: No focal deficit present.      Mental Status: She is alert and oriented to person, place, and time. Mental status is at baseline.   Psychiatric:         Mood and Affect: Mood normal.         Behavior: Behavior normal.         Judgment: Judgment normal.              ASSESSMENT AND PLAN:  Diagnoses and all orders for this visit:    1. Generalized anxiety disorder (Primary)    2. Insomnia due to other mental disorder  -     traZODone (DESYREL) 50 MG tablet; Take 1 tablet by mouth Every Night. If 1 tab not effective after 1 week may take 2 tabs, if 2 tabs not effective after 1 week may take 3 tabs  Dispense: 90 tablet; Refill: 0    3. Other bursal cyst, unspecified elbow  -     Ambulatory Referral to Orthopedic Surgery    4. Rash and nonspecific skin eruption  -     triamcinolone (KENALOG) 0.1 % ointment; Apply 1 Application topically to the appropriate area as directed 2 (Two) Times a Day.  Dispense: 30 g; Refill: 0      Anxiety is much improved, continue Lexapro 20 mg daily.  Discontinue BuSpar off of her medication list due to ineffectiveness.  Since she has not had improvement with her sleep, she will trial trazodone as listed above.  Educated on medication side effects.  If this medication does not seem to be helpful, she will follow-up in office.  She will be referred to orthopedic surgery for cyst on her elbow since she has not had any improvement.  Rash on abdomen looks consistent with insect bites.  She will use steroid in the meantime as prescribed above, follow-up if worsens.      Follow Up   Return in about 6 months (around 12/4/2024), or if symptoms worsen or fail to improve. Patient to notify office with any acute concerns or issues.  Patient verbalizes understanding, agrees with plan of care and has no  "with their own [employer] username and password and access your record to view the After Visit Summary.    If you have questions, you can email [email or web address] or call [phone] to talk to our [employer] staff.  Remember, [employer] is NOT to be used for urgent needs.  For medical emergencies, dial 911.               Other Info from Your Visit           Your Appointments     Jan 30, 2017 12:40 PM EST   Office Visit with MELVI Cook   [facility] (--)    [address], [address]  [facility] [address] [phone]           Arrive 15 minutes prior to appointment. Arrive 30 minutes prior to your appointment. Bring insurance card, photo ID and copay.              Allergies     Atorvastatin      Levofloxacin      Nsaids  Nausea Only    Victoza  [Liraglutide]        Reason for Visit     Pain           Vital Signs     Blood Pressure Pulse Temperature Respirations Height Weight    107/74 79 97.3 °F (36.3 °C) 16 64\" (162.6 cm) 167 lb (75.8 kg)    Last Menstrual Period Oxygen Saturation Body Mass Index Smoking Status          (LMP Unknown) 95% 28.67 kg/m2 Current Every Day Smoker        Problems and Diagnoses Noted     Chronic pain    Encounter for long-term (current) use of high-risk medication    Kienbock disease, adult    Arm pain        " further questions upon discharge.     Patient was given instructions and counseling regarding her condition or for health maintenance advice. Please see specific information pulled into the AVS if appropriate.     Discussed the importance of following up with any needed screening tests/labs/specialist appointments and any requested follow-up recommended by me today. Importance of maintaining follow-up discussed and patient accepts that missed appointments can delay diagnosis and potentially lead to worsening of conditions.    Part of this note may be an electronic transcription/translation of spoken language to printed text using the Dragon Dictation System.

## 2024-08-15 ENCOUNTER — OFFICE VISIT (OUTPATIENT)
Dept: FAMILY MEDICINE CLINIC | Age: 39
End: 2024-08-15
Payer: COMMERCIAL

## 2024-08-15 VITALS
TEMPERATURE: 98.3 F | HEIGHT: 68 IN | OXYGEN SATURATION: 98 % | SYSTOLIC BLOOD PRESSURE: 94 MMHG | HEART RATE: 76 BPM | BODY MASS INDEX: 35.46 KG/M2 | DIASTOLIC BLOOD PRESSURE: 67 MMHG | WEIGHT: 234 LBS

## 2024-08-15 DIAGNOSIS — F99 INSOMNIA DUE TO OTHER MENTAL DISORDER: Primary | ICD-10-CM

## 2024-08-15 DIAGNOSIS — F41.1 GENERALIZED ANXIETY DISORDER: ICD-10-CM

## 2024-08-15 DIAGNOSIS — F51.05 INSOMNIA DUE TO OTHER MENTAL DISORDER: Primary | ICD-10-CM

## 2024-08-15 PROCEDURE — 99214 OFFICE O/P EST MOD 30 MIN: CPT | Performed by: NURSE PRACTITIONER

## 2024-08-15 RX ORDER — HYDROCODONE BITARTRATE AND ACETAMINOPHEN 5; 325 MG/1; MG/1
1 TABLET ORAL EVERY 6 HOURS PRN
COMMUNITY
Start: 2024-07-23

## 2024-08-15 RX ORDER — ESCITALOPRAM OXALATE 20 MG/1
20 TABLET ORAL DAILY
Qty: 90 TABLET | Refills: 1 | Status: SHIPPED | OUTPATIENT
Start: 2024-08-15

## 2024-08-15 RX ORDER — ONDANSETRON 4 MG/1
4 TABLET, FILM COATED ORAL 3 TIMES DAILY PRN
COMMUNITY
Start: 2024-07-23

## 2024-08-15 RX ORDER — TRAZODONE HYDROCHLORIDE 150 MG/1
150 TABLET ORAL NIGHTLY
Qty: 90 TABLET | Refills: 1 | Status: SHIPPED | OUTPATIENT
Start: 2024-08-15

## 2024-08-15 NOTE — PROGRESS NOTES
"Leticia Durán presents to [facility] with complaint of  Insomnia (Medication refill on trazodone. Pt states she is taking 3 tabs at bedtime. )    SUBJECTIVE  History of Present Illness    Patient is here needing refill of trazodone. She was seen in the office for follow up of anxiety in June. Was not having success with hydroxyzine for sleep so she was started on trazodone at that time. She is currently taking 3 50 mg tabs at bedtime. She reports this has worked very well for her. She says lexapro is still working very well also. She said she called to get refill of trazodone and was told she needed an apt.       OBJECTIVE  Vital Signs:   BP 94/67 (BP Location: Right arm, Patient Position: Sitting)   Pulse 76   Temp 98.3 °F (36.8 °C) (Oral)   Ht 172.7 cm (68\")   Wt 106 kg (234 lb)   SpO2 98%   BMI 35.58 kg/m²       Physical Exam  Vitals reviewed.   Constitutional:       General: She is not in acute distress.     Appearance: Normal appearance. She is not ill-appearing.   HENT:      Head: Normocephalic and atraumatic.      Nose: Nose normal.      Mouth/Throat:      Mouth: Mucous membranes are moist.      Pharynx: Oropharynx is clear.   Cardiovascular:      Rate and Rhythm: Normal rate.   Pulmonary:      Effort: Pulmonary effort is normal.   Musculoskeletal:      Cervical back: Neck supple.   Skin:     General: Skin is warm and dry.   Neurological:      General: No focal deficit present.      Mental Status: She is alert and oriented to person, place, and time. Mental status is at baseline.   Psychiatric:         Mood and Affect: Mood normal.         Behavior: Behavior normal.         Judgment: Judgment normal.            ASSESSMENT AND PLAN:  Diagnoses and all orders for this visit:    1. Insomnia due to other mental disorder (Primary)  -     traZODone (DESYREL) 150 MG tablet; Take 1 tablet by mouth Every Night.  Dispense: 90 tablet; Refill: 1    2. Generalized anxiety " disorder  -     escitalopram (LEXAPRO) 20 MG tablet; Take 1 tablet by mouth Daily.  Dispense: 90 tablet; Refill: 1      Refills provided as her anxiety and insomnia is well controlled. Due for office visit in 6 months. Consider screening labs at that follow up.     Follow Up   Return in about 6 months (around 2/15/2025). Patient to notify office with any acute concerns or issues.  Patient verbalizes understanding, agrees with plan of care and has no further questions upon discharge.     Patient was given instructions and counseling regarding her condition or for health maintenance advice. Please see specific information pulled into the AVS if appropriate.     Discussed the importance of following up with any needed screening tests/labs/specialist appointments and any requested follow-up recommended by me today. Importance of maintaining follow-up discussed and patient accepts that missed appointments can delay diagnosis and potentially lead to worsening of conditions.    Part of this note may be an electronic transcription/translation of spoken language to printed text using the Dragon Dictation System.

## 2025-02-13 ENCOUNTER — OFFICE VISIT (OUTPATIENT)
Dept: FAMILY MEDICINE CLINIC | Age: 40
End: 2025-02-13
Payer: COMMERCIAL

## 2025-02-13 VITALS
HEART RATE: 82 BPM | OXYGEN SATURATION: 99 % | SYSTOLIC BLOOD PRESSURE: 116 MMHG | BODY MASS INDEX: 35.46 KG/M2 | WEIGHT: 234 LBS | DIASTOLIC BLOOD PRESSURE: 78 MMHG | TEMPERATURE: 98.3 F | HEIGHT: 68 IN

## 2025-02-13 DIAGNOSIS — Z00.00 ROUTINE ADULT HEALTH MAINTENANCE: ICD-10-CM

## 2025-02-13 DIAGNOSIS — M79.2 PAROXYSMAL NERVE PAIN: ICD-10-CM

## 2025-02-13 DIAGNOSIS — Z13.29 SCREENING FOR THYROID DISORDER: ICD-10-CM

## 2025-02-13 DIAGNOSIS — F51.05 INSOMNIA DUE TO OTHER MENTAL DISORDER: ICD-10-CM

## 2025-02-13 DIAGNOSIS — Z00.00 ANNUAL PHYSICAL EXAM: ICD-10-CM

## 2025-02-13 DIAGNOSIS — R53.83 OTHER FATIGUE: ICD-10-CM

## 2025-02-13 DIAGNOSIS — F99 INSOMNIA DUE TO OTHER MENTAL DISORDER: ICD-10-CM

## 2025-02-13 DIAGNOSIS — F41.1 GENERALIZED ANXIETY DISORDER: ICD-10-CM

## 2025-02-13 DIAGNOSIS — Z13.220 SCREENING FOR LIPID DISORDERS: ICD-10-CM

## 2025-02-13 DIAGNOSIS — E55.9 VITAMIN D DEFICIENCY: ICD-10-CM

## 2025-02-13 DIAGNOSIS — F32.1 CURRENT MODERATE EPISODE OF MAJOR DEPRESSIVE DISORDER WITHOUT PRIOR EPISODE: Primary | ICD-10-CM

## 2025-02-13 PROCEDURE — 99214 OFFICE O/P EST MOD 30 MIN: CPT | Performed by: NURSE PRACTITIONER

## 2025-02-13 PROCEDURE — 99395 PREV VISIT EST AGE 18-39: CPT | Performed by: NURSE PRACTITIONER

## 2025-02-13 RX ORDER — BUPROPION HYDROCHLORIDE 150 MG/1
150 TABLET ORAL DAILY
Qty: 90 TABLET | Refills: 1 | Status: SHIPPED | OUTPATIENT
Start: 2025-02-13

## 2025-02-13 RX ORDER — ESCITALOPRAM OXALATE 20 MG/1
20 TABLET ORAL DAILY
Qty: 90 TABLET | Refills: 1 | Status: SHIPPED | OUTPATIENT
Start: 2025-02-13

## 2025-02-13 RX ORDER — TRAZODONE HYDROCHLORIDE 150 MG/1
150 TABLET ORAL NIGHTLY
Qty: 90 TABLET | Refills: 1 | Status: SHIPPED | OUTPATIENT
Start: 2025-02-13

## 2025-02-13 NOTE — PROGRESS NOTES
"Leticia Durán presents to [facility] with complaint of  Anxiety (Follow up / med review ), Night Sweats (X 1 week ), Dizziness (Dizzy at times X 1 week), and Fatigue (X 1 week )    SUBJECTIVE  History of Present Illness    Patient is here for 6-month follow-up of anxiety and depression and she is due for annual exam.  She is currently on Lexapro 20 mg daily and trazodone 150 mg nightly for insomnia.  Patient feels that the medications are working but she does notice a recent increase in her anxiety and depression symptoms.  Patient does endorse being stressed due to home life as well as recent job change.  She says that she was let go from active day and is now working at [employer].  Patient says that these are 12-hour shifts and this is increasingly stressful on her mentally and her body as well.  She continues to have chronic low back pain.  She also has paresthesias of feet and legs.  She is supposed to be to having appointment and nerve conduction test with neurology.    Patient also mentions having 1 week history of night sweats, intermittent dizziness and fatigue.  Patient says that these episodes occur at random.  No precipitating factors.  She does not have any chest pain, shortness of air, heart palpitations, or sensation that she is going to pass out.    OBJECTIVE  Vital Signs:   /78 (BP Location: Left arm, Patient Position: Sitting, Cuff Size: Adult)   Pulse 82   Temp 98.3 °F (36.8 °C) (Oral)   Ht 172.7 cm (68\")   Wt 106 kg (234 lb)   SpO2 99%   BMI 35.58 kg/m²       Physical Exam  Vitals reviewed.   Constitutional:       General: She is not in acute distress.     Appearance: Normal appearance. She is not ill-appearing.   HENT:      Head: Normocephalic and atraumatic.      Nose: Nose normal.      Mouth/Throat:      Mouth: Mucous membranes are moist.      Pharynx: Oropharynx is clear.   Cardiovascular:      Rate and Rhythm: Normal rate and " regular rhythm.      Pulses: Normal pulses.      Heart sounds: Normal heart sounds.   Pulmonary:      Effort: Pulmonary effort is normal.      Breath sounds: Normal breath sounds.   Musculoskeletal:      Cervical back: Neck supple.   Skin:     General: Skin is warm and dry.   Neurological:      General: No focal deficit present.      Mental Status: She is alert and oriented to person, place, and time. Mental status is at baseline.   Psychiatric:         Mood and Affect: Mood normal.         Behavior: Behavior normal.         Judgment: Judgment normal.              ASSESSMENT AND PLAN:  Diagnoses and all orders for this visit:    1. Current moderate episode of major depressive disorder without prior episode (Primary)  -     buPROPion XL (Wellbutrin XL) 150 MG 24 hr tablet; Take 1 tablet by mouth Daily.  Dispense: 90 tablet; Refill: 1    2. Screening for thyroid disorder  -     TSH Rfx On Abnormal To Free T4; Future    3. Routine adult health maintenance  -     Comprehensive Metabolic Panel; Future  -     CBC & Differential; Future    4. Other fatigue  -     Vitamin B12 & Folate; Future    5. Vitamin D deficiency  -     Vitamin D,25-Hydroxy; Future    6. Screening for lipid disorders  -     Lipid Panel; Future    7. Insomnia due to other mental disorder  -     traZODone (DESYREL) 150 MG tablet; Take 1 tablet by mouth Every Night.  Dispense: 90 tablet; Refill: 1    8. Generalized anxiety disorder  -     buPROPion XL (Wellbutrin XL) 150 MG 24 hr tablet; Take 1 tablet by mouth Daily.  Dispense: 90 tablet; Refill: 1  -     escitalopram (LEXAPRO) 20 MG tablet; Take 1 tablet by mouth Daily.  Dispense: 90 tablet; Refill: 1    9. Annual physical exam    10. Paroxysmal nerve pain  -     EMG & Nerve Conduction Test; Future      Patient will continue Lexapro 20 mg daily.  Adding on Wellbutrin to see if this can better target her anxiety and depression symptoms.  Educated on medication side effects.  If she experiences  intolerable side effects she was encouraged to stop taking the medication.  Continue trazodone.  Screening labs ordered.  Dizziness, night sweats, and fatigue for the past week may be stress related or viral related.  Labs were ordered as part of routine adult health maintenance and due to recent fatigue/dizziness symptoms.  Patient does understand that she should follow-up in office if the symptoms do not seem to go away over the next couple of weeks or if new symptoms develop.  She has not been able to schedule nerve conduction test for some reason.  She is needing new order.       19 to 39: Counseling/Anticipatory Guidance Discussed: nutrition, physical activity, healthy weight, injury prevention, and mental health    Discussed with patient that I am transferring to a [facility] primary care office in [place].  Patient will like to follow provider there.  She was provided office phone number and address.  She is due for follow-up visit in 6 months or sooner if needed for her anxiety depression, or sooner as well if fatigue/dizziness/night sweats do not improve.    Follow Up   Return in about 6 months (around 8/13/2025). Patient to notify office with any acute concerns or issues.  Patient verbalizes understanding, agrees with plan of care and has no further questions upon discharge.     Patient was given instructions and counseling regarding her condition or for health maintenance advice. Please see specific information pulled into the AVS if appropriate.     Discussed the importance of following up with any needed screening tests/labs/specialist appointments and any requested follow-up recommended by me today. Importance of maintaining follow-up discussed and patient accepts that missed appointments can delay diagnosis and potentially lead to worsening of conditions.    Part of this note may be an electronic transcription/translation of spoken language to printed text using the Dragon Dictation System.

## 2025-03-04 ENCOUNTER — HOSPITAL ENCOUNTER (OUTPATIENT)
Facility: HOSPITAL | Age: 40
Discharge: HOME | End: 2025-03-04
Admitting: NURSE PRACTITIONER
Payer: COMMERCIAL

## 2025-03-04 DIAGNOSIS — M79.2 PAROXYSMAL NERVE PAIN: ICD-10-CM

## 2025-03-04 PROCEDURE — 95913 NRV CNDJ TEST 13/> STUDIES: CPT

## 2025-03-04 PROCEDURE — 95886 MUSC TEST DONE W/N TEST COMP: CPT

## 2025-03-04 NOTE — THERAPY EVALUATION
Physical Therapy Evaluation    SUBJECTIVE  Please see EMG report for details    OBJECTIVE  Please see EMG report for details.    ASSESSMENT  This was a normal study.    Please see EMG report for details.    PLAN  LTG 1:  Today:  Complete EMG / NCV study as appropriate and provide full report to the referring provider.   Treatment:  None    Frequency/Duration:  Evaluation only and discharge today.    Pt/responsible party agrees with plan of care and has been informed of all alternatives, risks and benefits.

## 2025-03-11 ENCOUNTER — OFFICE VISIT (OUTPATIENT)
Dept: FAMILY MEDICINE CLINIC | Age: 40
End: 2025-03-11
Payer: COMMERCIAL

## 2025-03-11 ENCOUNTER — LAB (OUTPATIENT)
Dept: LAB | Facility: HOSPITAL | Age: 40
End: 2025-03-11
Payer: COMMERCIAL

## 2025-03-11 ENCOUNTER — HOSPITAL ENCOUNTER (OUTPATIENT)
Dept: GENERAL RADIOLOGY | Facility: HOSPITAL | Age: 40
Discharge: HOME | End: 2025-03-11
Payer: COMMERCIAL

## 2025-03-11 VITALS
BODY MASS INDEX: 35.4 KG/M2 | HEIGHT: 68 IN | SYSTOLIC BLOOD PRESSURE: 121 MMHG | HEART RATE: 92 BPM | TEMPERATURE: 98.1 F | OXYGEN SATURATION: 98 % | DIASTOLIC BLOOD PRESSURE: 56 MMHG | WEIGHT: 233.6 LBS

## 2025-03-11 DIAGNOSIS — Z00.00 ROUTINE ADULT HEALTH MAINTENANCE: ICD-10-CM

## 2025-03-11 DIAGNOSIS — E55.9 VITAMIN D DEFICIENCY: ICD-10-CM

## 2025-03-11 DIAGNOSIS — Z23 IMMUNIZATION DUE: ICD-10-CM

## 2025-03-11 DIAGNOSIS — M79.671 RIGHT FOOT PAIN: ICD-10-CM

## 2025-03-11 DIAGNOSIS — R53.83 OTHER FATIGUE: ICD-10-CM

## 2025-03-11 DIAGNOSIS — Z13.29 SCREENING FOR THYROID DISORDER: ICD-10-CM

## 2025-03-11 DIAGNOSIS — S92.514A CLOSED NONDISPLACED FRACTURE OF PROXIMAL PHALANX OF LESSER TOE OF RIGHT FOOT, INITIAL ENCOUNTER: Primary | ICD-10-CM

## 2025-03-11 DIAGNOSIS — Z13.220 SCREENING FOR LIPID DISORDERS: ICD-10-CM

## 2025-03-11 LAB
25(OH)D3 SERPL-MCNC: 18.6 NG/ML (ref 30–100)
ALBUMIN SERPL-MCNC: 3.6 G/DL (ref 3.5–5.2)
ALBUMIN/GLOB SERPL: 1 G/DL
ALP SERPL-CCNC: 64 U/L (ref 39–117)
ALT SERPL W P-5'-P-CCNC: 14 U/L (ref 1–33)
ANION GAP SERPL CALCULATED.3IONS-SCNC: 12.4 MMOL/L (ref 5–15)
AST SERPL-CCNC: 17 U/L (ref 1–32)
BASOPHILS # BLD AUTO: 0.04 10*3/MM3 (ref 0–0.2)
BASOPHILS NFR BLD AUTO: 0.5 % (ref 0–1.5)
BILIRUB SERPL-MCNC: <0.2 MG/DL (ref 0–1.2)
BUN SERPL-MCNC: 12 MG/DL (ref 6–20)
BUN/CREAT SERPL: 11.4 (ref 7–25)
CALCIUM SPEC-SCNC: 9.2 MG/DL (ref 8.6–10.5)
CHLORIDE SERPL-SCNC: 103 MMOL/L (ref 98–107)
CHOLEST SERPL-MCNC: 177 MG/DL (ref 0–200)
CO2 SERPL-SCNC: 21.6 MMOL/L (ref 22–29)
CREAT SERPL-MCNC: 1.05 MG/DL (ref 0.57–1)
DEPRECATED RDW RBC AUTO: 45.7 FL (ref 37–54)
EGFRCR SERPLBLD CKD-EPI 2021: 69.9 ML/MIN/1.73
EOSINOPHIL # BLD AUTO: 0.16 10*3/MM3 (ref 0–0.4)
EOSINOPHIL NFR BLD AUTO: 2.1 % (ref 0.3–6.2)
ERYTHROCYTE [DISTWIDTH] IN BLOOD BY AUTOMATED COUNT: 13.1 % (ref 12.3–15.4)
FOLATE SERPL-MCNC: 3.05 NG/ML (ref 4.78–24.2)
GLOBULIN UR ELPH-MCNC: 3.7 GM/DL
GLUCOSE SERPL-MCNC: 106 MG/DL (ref 65–99)
HCT VFR BLD AUTO: 38.9 % (ref 34–46.6)
HDLC SERPL-MCNC: 58 MG/DL (ref 40–60)
HGB BLD-MCNC: 12.3 G/DL (ref 12–15.9)
IMM GRANULOCYTES # BLD AUTO: 0.01 10*3/MM3 (ref 0–0.05)
IMM GRANULOCYTES NFR BLD AUTO: 0.1 % (ref 0–0.5)
LDLC SERPL CALC-MCNC: 99 MG/DL (ref 0–100)
LDLC/HDLC SERPL: 1.66 {RATIO}
LYMPHOCYTES # BLD AUTO: 2.22 10*3/MM3 (ref 0.7–3.1)
LYMPHOCYTES NFR BLD AUTO: 29.8 % (ref 19.6–45.3)
MCH RBC QN AUTO: 29.6 PG (ref 26.6–33)
MCHC RBC AUTO-ENTMCNC: 31.6 G/DL (ref 31.5–35.7)
MCV RBC AUTO: 93.7 FL (ref 79–97)
MONOCYTES # BLD AUTO: 0.58 10*3/MM3 (ref 0.1–0.9)
MONOCYTES NFR BLD AUTO: 7.8 % (ref 5–12)
NEUTROPHILS NFR BLD AUTO: 4.44 10*3/MM3 (ref 1.7–7)
NEUTROPHILS NFR BLD AUTO: 59.7 % (ref 42.7–76)
PLATELET # BLD AUTO: 272 10*3/MM3 (ref 140–450)
PMV BLD AUTO: 9 FL (ref 6–12)
POTASSIUM SERPL-SCNC: 3.9 MMOL/L (ref 3.5–5.2)
PROT SERPL-MCNC: 7.3 G/DL (ref 6–8.5)
RBC # BLD AUTO: 4.15 10*6/MM3 (ref 3.77–5.28)
SODIUM SERPL-SCNC: 137 MMOL/L (ref 136–145)
TRIGL SERPL-MCNC: 114 MG/DL (ref 0–150)
TSH SERPL DL<=0.05 MIU/L-ACNC: 2.4 UIU/ML (ref 0.27–4.2)
VIT B12 BLD-MCNC: 367 PG/ML (ref 211–946)
VLDLC SERPL-MCNC: 20 MG/DL (ref 5–40)
WBC NRBC COR # BLD AUTO: 7.45 10*3/MM3 (ref 3.4–10.8)

## 2025-03-11 PROCEDURE — 80050 GENERAL HEALTH PANEL: CPT

## 2025-03-11 PROCEDURE — 80061 LIPID PANEL: CPT

## 2025-03-11 PROCEDURE — 82607 VITAMIN B-12: CPT

## 2025-03-11 PROCEDURE — 82746 ASSAY OF FOLIC ACID SERUM: CPT

## 2025-03-11 PROCEDURE — 36415 COLL VENOUS BLD VENIPUNCTURE: CPT

## 2025-03-11 PROCEDURE — 73630 X-RAY EXAM OF FOOT: CPT

## 2025-03-11 PROCEDURE — 82306 VITAMIN D 25 HYDROXY: CPT

## 2025-03-11 NOTE — PROGRESS NOTES
Subjective     CHIEF COMPLAINT    Chief Complaint   Patient presents with    Foot Pain     (R), onset yesterday.      Patient or patient representative verbalized consent for the use of Ambient Listening during the visit with  ABIODUN Yip for chart documentation. 3/11/2025  14:19 EDT    History of Present Illness  The patient is a 40-year-old female who presents for evaluation of right foot pain.    She reports an incident that occurred yesterday at approximately 2:00 PM, where she inadvertently kicked an ottoman twice while exiting the bathroom. This has resulted in significant pain in her right foot, which is exacerbated by walking or moving the foot. The pain is so severe that it prevents her from bearing weight on the affected foot, necessitating her to hop around on her heel. Her mother attempted to alleviate the discomfort by applying tape to her toes. She also experiences intermittent shooting pains across the top of her foot. She reports no associated ankle pain. The pain is so intense that even the weight of a blanket on her foot is unbearable. 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 Despite the pain, she attempted to work today.            Review of Systems   Constitutional:  Negative for chills and fever.   Musculoskeletal:  Positive for arthralgias.   Skin:  Positive for color change (bruising right foot). Negative for wound.         Past Medical History:   Diagnosis Date    ADHD (attention deficit hyperactivity disorder)     Allergic     Seasonal    Anemia 2008    Anxiety 2022    Happened at work    Fibromyalgia, primary 2018    I was told by a chiropractor i had it    Low back pain     All my life    Scoliosis     All my life         Past Surgical History:   Procedure Laterality Date     SECTION      HYSTERECTOMY      TONSILLECTOMY  1990    TUBAL ABDOMINAL LIGATION  2015         Family History  "  Problem Relation Age of Onset    Arthritis Mother     Arthritis Father     Hearing loss Father     Hyperlipidemia Father          Social History     Socioeconomic History    Marital status: Legally    Tobacco Use    Smoking status: Never    Smokeless tobacco: Never   Vaping Use    Vaping status: Never Used   Substance and Sexual Activity    Alcohol use: Yes     Comment: Occasion    Drug use: Never    Sexual activity: Not Currently     Partners: Male     Birth control/protection: Surgical         Allergies   Allergen Reactions    Morphine Itching          Current Outpatient Medications on File Prior to Visit   Medication Sig Dispense Refill    buPROPion XL (Wellbutrin XL) 150 MG 24 hr tablet Take 1 tablet by mouth Daily. 90 tablet 1    escitalopram (LEXAPRO) 20 MG tablet Take 1 tablet by mouth Daily. 90 tablet 1    traZODone (DESYREL) 150 MG tablet Take 1 tablet by mouth Every Night. 90 tablet 1     No current facility-administered medications on file prior to visit.          /56   Pulse 92   Temp 98.1 °F (36.7 °C) (Oral)   Ht 172.7 cm (67.99\")   Wt 106 kg (233 lb 9.6 oz)   SpO2 98% Comment: room air  BMI 35.53 kg/m²       Objective     Physical Exam  Vitals and nursing note reviewed.   Constitutional:       General: She is not in acute distress.     Appearance: Normal appearance. She is not ill-appearing.   Cardiovascular:      Pulses:           Dorsalis pedis pulses are 2+ on the right side.        Posterior tibial pulses are 2+ on the right side.   Musculoskeletal:      Right ankle: Normal.      Right foot: Normal range of motion.        Feet:    Feet:      Right foot:      Skin integrity: No skin breakdown or erythema.      Comments: Ecchymosis noted to right foot, approximately in area noted above. Tender to palpation. No obvious deformities noted. Foot is warm, cap refill <2   Skin:     General: Skin is warm and dry.      Capillary Refill: Capillary refill takes less than 2 seconds. "   Neurological:      General: No focal deficit present.      Mental Status: She is alert and oriented to person, place, and time.   Psychiatric:         Mood and Affect: Mood and affect normal.         Behavior: Behavior normal.           XR Foot 3+ View Right  Result Date: 3/11/2025  XR FOOT 3+ VW RIGHT Date of Exam: 3/11/2025 2:47 PM EDT Indication: right foot pain, kicked ottoman yesterday Comparison: None available. Findings: Transversely oriented nondisplaced fracture involving the proximal margin proximal phalanx fifth toe without intra-articular fracture extension. No joint dislocation. No retained radiopaque foreign body. Small plantar calcaneal spur. Mild first MTP joint  space narrowing with spurring. Tibiotalar alignment is normal.     Impression: Nondisplaced fracture of the proximal phalanx of the right fifth toe. No joint dislocation. Electronically Signed: Aby Yip MD  3/11/2025 3:11 PM EDT  Workstation ID: [id number]         Assessment & Plan  Closed nondisplaced fracture of proximal phalanx of lesser toe of right foot, initial encounter  Xray shows nondisplaced fracture of phalanx of the right fifth toe. Voltaren sent to pharmacy and urgent referral sent to podiatry to evaluation. Patient provided with crutches, she has a boot at home that she can use PRN. Toes were buddy taped in office today. Work note provided.     Orders:    Ambulatory Referral to Podiatry    diclofenac (VOLTAREN) 50 MG EC tablet; Take 1 tablet by mouth 2 (Two) Times a Day As Needed (pain).    Right foot pain    Orders:    XR Foot 3+ View Right; Future    Immunization due    Orders:    COVID-19 (Pfizer) 12yrs+ (COMIRNATY)        Follow up:  Return if symptoms worsen or fail to improve.  Patient was given instructions and counseling regarding her condition or for health maintenance advice. Please see specific information pulled into the AVS if appropriate.

## 2025-03-11 NOTE — LETTER
March 11, 2025     Patient: Leticia Durán   YOB: 1985   Date of Visit: 3/11/2025       To Whom It May Concern:    It is my medical opinion that Leticia Durán may return to work on 3/17/25         Sincerely,        ABIODUN Yip

## 2025-03-25 ENCOUNTER — OFFICE VISIT (OUTPATIENT)
Dept: FAMILY MEDICINE CLINIC | Age: 40
End: 2025-03-25
Payer: COMMERCIAL

## 2025-03-25 VITALS
HEIGHT: 68 IN | DIASTOLIC BLOOD PRESSURE: 82 MMHG | BODY MASS INDEX: 36.34 KG/M2 | HEART RATE: 60 BPM | SYSTOLIC BLOOD PRESSURE: 133 MMHG | TEMPERATURE: 98.2 F | OXYGEN SATURATION: 98 % | WEIGHT: 239.8 LBS

## 2025-03-25 DIAGNOSIS — R05.9 COUGH, UNSPECIFIED TYPE: Primary | ICD-10-CM

## 2025-03-25 DIAGNOSIS — J02.9 SORE THROAT: ICD-10-CM

## 2025-03-25 PROCEDURE — 87428 SARSCOV & INF VIR A&B AG IA: CPT | Performed by: NURSE PRACTITIONER

## 2025-03-25 PROCEDURE — 87880 STREP A ASSAY W/OPTIC: CPT | Performed by: NURSE PRACTITIONER

## 2025-03-25 PROCEDURE — 99213 OFFICE O/P EST LOW 20 MIN: CPT | Performed by: NURSE PRACTITIONER

## 2025-03-25 PROCEDURE — 87081 CULTURE SCREEN ONLY: CPT | Performed by: NURSE PRACTITIONER

## 2025-03-25 RX ORDER — GABAPENTIN 300 MG/1
CAPSULE ORAL
COMMUNITY
Start: 2025-03-17

## 2025-03-25 NOTE — ASSESSMENT & PLAN NOTE
Rest, increase fluids, follow up if symptoms progress or change   Recommend she try mucinex or mucinex dm and tylenol or advil prn, increase her vitamin d, may  need to be tested again, to cover her work off, March 26,27 and 28, she was off today

## 2025-03-25 NOTE — LETTER
March 25, 2025     Patient: Leticia Durán   YOB: 1985   Date of Visit: 3/25/2025       To Whom It May Concern:    It is my medical opinion that Leticia Durán be excused from work March 26th, 27th, and 28th, 2025. She may return march 29th, 2025.     Sincerely,        ABIODUN Trevino    CC: No Recipients

## 2025-03-25 NOTE — PROGRESS NOTES
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  Symptoms: Cough, headache, sore throat, nausea, chills, fatigue   Treatment tried:nothing     PMH changes:    ADHD  Allergies  Anxiety  Fibromyalgia  Scoliosis     Surgery:    BTL  C section X 2   Hysterectomy    Family history:    Mother: arthritis  Father : HLD /HTN    Social history :    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  Marital staus: marital   Children: 2           Past Medical History:   Diagnosis Date    ADHD (attention deficit hyperactivity disorder)     Allergic     Seasonal    Anemia 2008    Anxiety 2022    Happened at work    Fibromyalgia, primary 2018    I was told by a chiropractor i had it    Low back pain     All my life    Scoliosis     All my life       Allergies   Allergen Reactions    Morphine Itching        Past Surgical History:   Procedure Laterality Date     SECTION      HYSTERECTOMY      TONSILLECTOMY      TUBAL ABDOMINAL LIGATION  2015        Social History     Tobacco Use    Smoking status: Never    Smokeless tobacco: Never   Substance Use Topics    Alcohol use: Yes     Comment: Occasion       Family History   Problem Relation Age of Onset    Arthritis Mother     Arthritis Father     Hearing loss Father     Hyperlipidemia Father         There are no preventive care reminders to display for this patient.     Current Outpatient Medications on File Prior to Visit   Medication Sig    buPROPion XL (Wellbutrin XL) 150 MG 24 hr tablet Take 1 tablet by mouth Daily.    diclofenac (VOLTAREN) 50 MG EC tablet Take 1 tablet by mouth 2 (Two) Times a Day As Needed (pain).    escitalopram (LEXAPRO)  "20 MG tablet Take 1 tablet by mouth Daily.    gabapentin (NEURONTIN) 300 MG capsule     traZODone (DESYREL) 150 MG tablet Take 1 tablet by mouth Every Night.    vitamin D (ERGOCALCIFEROL) 1.25 MG (21210 UT) capsule capsule Take 1 capsule by mouth Every 7 (Seven) Days.     No current facility-administered medications on file prior to visit.       Immunization History   Administered Date(s) Administered    COVID-19 (PFIZER) 12YRS+ (COMIRNATY) 03/11/2025    COVID-19 (PFIZER) Purple Cap Monovalent 06/07/2021, 06/28/2021, 01/24/2022    Flu Vaccine Split Quad 12/10/2021    Fluzone (or Fluarix & Flulaval for VFC) >6mos 12/10/2021, 10/26/2023    Tdap 06/27/2022       Review of Systems   Constitutional:  Positive for chills. Negative for fever.   HENT:  Positive for sore throat.    Respiratory:  Positive for cough. Negative for shortness of breath.    Cardiovascular:  Negative for chest pain.   Gastrointestinal:  Positive for nausea.   Musculoskeletal:  Positive for arthralgias (fracture  5th R toe, in a boot).   Neurological:  Positive for headache.        Objective     Vitals:    03/25/25 1314   BP: 133/82   BP Location: Left arm   Patient Position: Sitting   Cuff Size: Large Adult   Pulse: 60   Temp: 98.2 °F (36.8 °C)   TempSrc: Oral   SpO2: 98%   Weight: 109 kg (239 lb 12.8 oz)   Height: 172.7 cm (67.99\")            Physical Exam  Constitutional:       General: She is not in acute distress.     Appearance: Normal appearance. She is obese.   HENT:      Right Ear: Tympanic membrane, ear canal and external ear normal.      Left Ear: Tympanic membrane, ear canal and external ear normal.      Nose: Congestion present.      Mouth/Throat:      Pharynx: Oropharynx is clear. No posterior oropharyngeal erythema.   Cardiovascular:      Rate and Rhythm: Normal rate and regular rhythm.      Heart sounds: No murmur heard.  Pulmonary:      Effort: Pulmonary effort is normal.      Breath sounds: Normal breath sounds.   Lymphadenopathy: "      Cervical: No cervical adenopathy.   Neurological:      Mental Status: She is alert.   Psychiatric:         Mood and Affect: Mood normal.         Behavior: Behavior normal.         Result Review :   Results for orders placed or performed in visit on 03/25/25   POCT rapid strep A    Collection Time: 03/25/25  1:35 PM    Specimen: Swab   Result Value Ref Range    Rapid Strep A Screen Negative Negative, VALID, INVALID, Not Performed    Internal Control Passed Passed    Lot Number #[id number]     Expiration Date 2-1-26    POCT SARS-CoV-2 Antigen SUZETTE + Flu    Collection Time: 03/25/25  1:40 PM    Specimen: Swab   Result Value Ref Range    SARS Antigen Not Detected Not Detected, Presumptive Negative    Influenza A Antigen SUZETTE Not Detected Not Detected    Influenza B Antigen SUZETTE Not Detected Not Detected    Internal Control Passed Passed    Lot Number [id number]     Expiration Date 12-11-25        The following data was reviewed by: ABIODUN Trevino on 03/25/2025:                       Assessment and Plan      Diagnoses and all orders for this visit:    1. Cough, unspecified type (Primary)  Assessment & Plan:  Rest, increase fluids, follow up if symptoms progress or change   Recommend she try mucinex or mucinex dm and tylenol or advil prn, increase her vitamin d, may  need to be tested again, to cover her work off, March 26,27 and 28, she was off today       Orders:  -     POCT SARS-CoV-2 Antigen SUZETTE + Flu    2. Sore throat  Assessment & Plan:  Warm salt water gargles  TC pending     Orders:  -     POCT rapid strep A  -     Beta Strep Culture, Throat - , Throat; Future  -     Beta Strep Culture, Throat - Swab, Throat                 Follow Up     Return if symptoms worsen or fail to improve.    Patient was given instructions and counseling regarding her condition or for health maintenance advice. Please see specific information pulled into the AVS if appropriate.

## 2025-03-27 LAB — BACTERIA SPEC AEROBE CULT: NORMAL
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
## 2025-04-08 ENCOUNTER — LAB (OUTPATIENT)
Dept: LAB | Facility: HOSPITAL | Age: 40
End: 2025-04-08
Payer: COMMERCIAL

## 2025-04-08 ENCOUNTER — OFFICE VISIT (OUTPATIENT)
Dept: NEUROLOGY | Facility: CLINIC | Age: 40
End: 2025-04-08
Payer: COMMERCIAL

## 2025-04-08 VITALS
HEIGHT: 68 IN | WEIGHT: 240.1 LBS | HEART RATE: 78 BPM | DIASTOLIC BLOOD PRESSURE: 66 MMHG | BODY MASS INDEX: 36.39 KG/M2 | SYSTOLIC BLOOD PRESSURE: 115 MMHG

## 2025-04-08 DIAGNOSIS — G62.9 SMALL FIBER NEUROPATHY: Primary | ICD-10-CM

## 2025-04-08 DIAGNOSIS — G62.9 SMALL FIBER NEUROPATHY: ICD-10-CM

## 2025-04-08 LAB
ALBUMIN SERPL-MCNC: 4.2 G/DL (ref 3.5–5.2)
ALBUMIN/GLOB SERPL: 1.2 G/DL
ALP SERPL-CCNC: 66 U/L (ref 39–117)
ALT SERPL W P-5'-P-CCNC: 14 U/L (ref 1–33)
ANION GAP SERPL CALCULATED.3IONS-SCNC: 7 MMOL/L (ref 5–15)
AST SERPL-CCNC: 18 U/L (ref 1–32)
BILIRUB SERPL-MCNC: 0.3 MG/DL (ref 0–1.2)
BUN SERPL-MCNC: 11 MG/DL (ref 6–20)
BUN/CREAT SERPL: 12.9 (ref 7–25)
CALCIUM SPEC-SCNC: 9.6 MG/DL (ref 8.6–10.5)
CHLORIDE SERPL-SCNC: 106 MMOL/L (ref 98–107)
CHROMATIN AB SERPL-ACNC: <10 IU/ML (ref 0–14)
CO2 SERPL-SCNC: 24 MMOL/L (ref 22–29)
CREAT SERPL-MCNC: 0.85 MG/DL (ref 0.57–1)
DEPRECATED RDW RBC AUTO: 39.9 FL (ref 37–54)
EGFRCR SERPLBLD CKD-EPI 2021: 90.1 ML/MIN/1.73
ERYTHROCYTE [DISTWIDTH] IN BLOOD BY AUTOMATED COUNT: 12.3 % (ref 12.3–15.4)
ERYTHROCYTE [SEDIMENTATION RATE] IN BLOOD: 20 MM/HR (ref 0–20)
GLOBULIN UR ELPH-MCNC: 3.5 GM/DL
GLUCOSE SERPL-MCNC: 86 MG/DL (ref 65–99)
HBA1C MFR BLD: 5.2 % (ref 4.8–5.6)
HCT VFR BLD AUTO: 37.7 % (ref 34–46.6)
HGB BLD-MCNC: 12.7 G/DL (ref 12–15.9)
MCH RBC QN AUTO: 30.3 PG (ref 26.6–33)
MCHC RBC AUTO-ENTMCNC: 33.7 G/DL (ref 31.5–35.7)
MCV RBC AUTO: 90 FL (ref 79–97)
PLATELET # BLD AUTO: 279 10*3/MM3 (ref 140–450)
PMV BLD AUTO: 10.3 FL (ref 6–12)
POTASSIUM SERPL-SCNC: 4.4 MMOL/L (ref 3.5–5.2)
PROT SERPL-MCNC: 7.7 G/DL (ref 6–8.5)
RBC # BLD AUTO: 4.19 10*6/MM3 (ref 3.77–5.28)
SODIUM SERPL-SCNC: 137 MMOL/L (ref 136–145)
TSH SERPL DL<=0.05 MIU/L-ACNC: 0.89 UIU/ML (ref 0.27–4.2)
WBC NRBC COR # BLD AUTO: 6.62 10*3/MM3 (ref 3.4–10.8)

## 2025-04-08 PROCEDURE — 80050 GENERAL HEALTH PANEL: CPT

## 2025-04-08 PROCEDURE — 83521 IG LIGHT CHAINS FREE EACH: CPT

## 2025-04-08 PROCEDURE — 84446 ASSAY OF VITAMIN E: CPT

## 2025-04-08 PROCEDURE — 86431 RHEUMATOID FACTOR QUANT: CPT

## 2025-04-08 PROCEDURE — 84155 ASSAY OF PROTEIN SERUM: CPT

## 2025-04-08 PROCEDURE — 86038 ANTINUCLEAR ANTIBODIES: CPT

## 2025-04-08 PROCEDURE — 84425 ASSAY OF VITAMIN B-1: CPT

## 2025-04-08 PROCEDURE — 85652 RBC SED RATE AUTOMATED: CPT

## 2025-04-08 PROCEDURE — 83036 HEMOGLOBIN GLYCOSYLATED A1C: CPT

## 2025-04-08 PROCEDURE — 84165 PROTEIN E-PHORESIS SERUM: CPT

## 2025-04-08 PROCEDURE — 84207 ASSAY OF VITAMIN B-6: CPT

## 2025-04-08 PROCEDURE — 82784 ASSAY IGA/IGD/IGG/IGM EACH: CPT

## 2025-04-08 PROCEDURE — 83921 ORGANIC ACID SINGLE QUANT: CPT

## 2025-04-08 PROCEDURE — 86334 IMMUNOFIX E-PHORESIS SERUM: CPT

## 2025-04-08 PROCEDURE — 36415 COLL VENOUS BLD VENIPUNCTURE: CPT

## 2025-04-08 RX ORDER — CREAM BASE NO.39
CREAM (GRAM) TOPICAL AS NEEDED
COMMUNITY
Start: 2025-03-31

## 2025-04-08 RX ORDER — FOLIC ACID 1 MG/1
1 TABLET ORAL DAILY
Qty: 90 TABLET | Refills: 2 | Status: SHIPPED | OUTPATIENT
Start: 2025-04-08

## 2025-04-11 LAB
ALBUMIN SERPL ELPH-MCNC: 3.7 G/DL (ref 2.9–4.4)
ALBUMIN/GLOB SERPL: 1.1 {RATIO} (ref 0.7–1.7)
ALPHA1 GLOB SERPL ELPH-MCNC: 0.2 G/DL (ref 0–0.4)
ALPHA2 GLOB SERPL ELPH-MCNC: 0.8 G/DL (ref 0.4–1)
ANA SER QL IF: NEGATIVE
B-GLOBULIN SERPL ELPH-MCNC: 1.1 G/DL (ref 0.7–1.3)
GAMMA GLOB SERPL ELPH-MCNC: 1.4 G/DL (ref 0.4–1.8)
GLOBULIN SER-MCNC: 3.6 G/DL (ref 2.2–3.9)
IGA SERPL-MCNC: 216 MG/DL (ref 87–352)
IGG SERPL-MCNC: 1354 MG/DL (ref 586–1602)
IGM SERPL-MCNC: 200 MG/DL (ref 26–217)
INTERPRETATION SERPL IEP-IMP: ABNORMAL
KAPPA LC FREE SER-MCNC: 20.6 MG/L (ref 3.3–19.4)
KAPPA LC FREE/LAMBDA FREE SER: 1.26 {RATIO} (ref 0.26–1.65)
LABORATORY COMMENT REPORT: ABNORMAL
LABORATORY COMMENT REPORT: NORMAL
LAMBDA LC FREE SERPL-MCNC: 16.3 MG/L (ref 5.7–26.3)
M PROTEIN SERPL ELPH-MCNC: ABNORMAL G/DL
PROT SERPL-MCNC: 7.3 G/DL (ref 6–8.5)

## 2025-04-12 LAB — PYRIDOXAL PHOS SERPL-MCNC: 30.1 UG/L (ref 3.4–65.2)

## 2025-04-13 LAB — METHYLMALONATE SERPL-SCNC: 158 NMOL/L (ref 0–378)

## 2025-04-14 LAB — VIT B1 BLD-SCNC: 97.3 NMOL/L (ref 66.5–200)

## 2025-04-15 LAB
A-TOCOPHEROL VIT E SERPL-MCNC: 7.5 MG/L (ref 5.9–19.4)
GAMMA TOCOPHEROL SERPL-MCNC: 2.1 MG/L (ref 0.7–4.9)